# Patient Record
Sex: FEMALE | Race: OTHER | NOT HISPANIC OR LATINO | Employment: STUDENT | URBAN - METROPOLITAN AREA
[De-identification: names, ages, dates, MRNs, and addresses within clinical notes are randomized per-mention and may not be internally consistent; named-entity substitution may affect disease eponyms.]

---

## 2019-02-18 ENCOUNTER — OFFICE VISIT (OUTPATIENT)
Dept: OBGYN CLINIC | Facility: CLINIC | Age: 19
End: 2019-02-18
Payer: COMMERCIAL

## 2019-02-18 VITALS — WEIGHT: 211 LBS | DIASTOLIC BLOOD PRESSURE: 70 MMHG | SYSTOLIC BLOOD PRESSURE: 118 MMHG

## 2019-02-18 DIAGNOSIS — N90.89 SKIN TAG OF VULVA: Primary | ICD-10-CM

## 2019-02-18 PROCEDURE — 11200 RMVL SKIN TAGS UP TO&INC 15: CPT | Performed by: OBSTETRICS & GYNECOLOGY

## 2019-02-18 RX ORDER — GENTAMICIN SULFATE 3 MG/ML
SOLUTION/ DROPS OPHTHALMIC
Refills: 0 | COMMUNITY
Start: 2019-01-04

## 2019-02-18 RX ORDER — DOXEPIN HYDROCHLORIDE 25 MG/1
CAPSULE ORAL
Refills: 3 | COMMUNITY
Start: 2019-02-12

## 2019-02-18 RX ORDER — LEVONORGESTREL / ETHINYL ESTRADIOL AND ETHINYL ESTRADIOL 150-30(84)
1 KIT ORAL DAILY
Refills: 3 | COMMUNITY
Start: 2019-02-10

## 2019-02-18 NOTE — PATIENT INSTRUCTIONS
Topic:  Left vulvar skin tag    Skin today was removed without difficulty with the aid of curved scissors after the area had been anesthetized with local anesthetic and prepped with Betadine    Excellent hemostasis was achieved with Monsel's solution    Instructions were given to patient    She was encouraged to use warm tea bags soaks    Further treatment will be based upon final pathology results    Patient call for any problems issues or concerns that may arise

## 2019-02-18 NOTE — PROGRESS NOTES
Skin tag removal  Date/Time: 2/18/2019 12:59 PM  Performed by: Dasha Pollack MD  Authorized by: Dasha Pollack MD     Procedure Details - Skin Tag Destruction:     Up to 15      Body area:   Anogenital    Anogenital location:  Vulva    Initial size (mm):  5    Final defect size (mm):  10    Malignancy: benign lesion      Destruction method: scissors used for extraction    Lesion 6:      Topic:  Vulvar skin tag space    left side    1 cc of local anesthetic was injected beneath the skin tag after the area had been prepped with Betadine solution    The tag was gently lifted with forceps and removed with the aid of curved fine scissors    Excellent hemostasis was achieved with Monsel's solution    Instructions were given to the patient    Patient was encouraged to use warm tea bags soaks    Further treatment will be based upon final pathology results    Patient call for any problems issues or concerns that may arise

## 2019-03-08 ENCOUNTER — TELEPHONE (OUTPATIENT)
Dept: OBGYN CLINIC | Facility: CLINIC | Age: 19
End: 2019-03-08

## 2019-03-08 NOTE — TELEPHONE ENCOUNTER
----- Message from Luz Marina Corley MD sent at 3/8/2019  2:20 PM EST -----  Benign biopsy    Could you call the lab and  see if they could run HPV typing on the tissue    Thanks

## 2019-03-12 ENCOUNTER — TELEPHONE (OUTPATIENT)
Dept: OBGYN CLINIC | Facility: CLINIC | Age: 19
End: 2019-03-12

## 2019-03-15 LAB
CLINICAL INFO: NORMAL
HUMAN PAPILLOMA VIRUS 16+18 DNA [PRESENCE] IN TISSUE BY PROBE: NOT DETECTED
HUMAN PAPILLOMA VIRUS 6+11 DNA [PRESENCE] IN TISSUE BY PROBE: NOT DETECTED
Lab: DETECTED
Lab: NORMAL
Lab: NORMAL
PATH REPORT.FINAL DX SPEC: NORMAL
PRIMARY SITE CANCER NARRATIVE: NORMAL
PROCEDURE TYPE: NORMAL
QUESTION/PROBLEM: NORMAL
REF LAB TEST NAME: NORMAL
REF LAB TEST: NORMAL
SL AMB CLIENT CONTACT: NORMAL
SL AMB CONTAINER TYPE: NORMAL
SL AMB FINAL RESOLUTION: NORMAL
SL AMB REPORT STATUS: NORMAL
SPECIMEN SOURCE: NORMAL

## 2019-09-15 ENCOUNTER — HOSPITAL ENCOUNTER (EMERGENCY)
Facility: HOSPITAL | Age: 19
Discharge: HOME | End: 2019-09-15
Attending: EMERGENCY MEDICINE | Admitting: EMERGENCY MEDICINE
Payer: COMMERCIAL

## 2019-09-15 ENCOUNTER — APPOINTMENT (EMERGENCY)
Dept: RADIOLOGY | Facility: HOSPITAL | Age: 19
End: 2019-09-15
Payer: COMMERCIAL

## 2019-09-15 VITALS
BODY MASS INDEX: 40.74 KG/M2 | RESPIRATION RATE: 18 BRPM | DIASTOLIC BLOOD PRESSURE: 81 MMHG | SYSTOLIC BLOOD PRESSURE: 140 MMHG | HEART RATE: 75 BPM | WEIGHT: 229.94 LBS | HEIGHT: 63 IN | OXYGEN SATURATION: 98 % | TEMPERATURE: 97.6 F

## 2019-09-15 DIAGNOSIS — J32.4 PANSINUSITIS, UNSPECIFIED CHRONICITY: ICD-10-CM

## 2019-09-15 DIAGNOSIS — G44.319 ACUTE POST-TRAUMATIC HEADACHE, NOT INTRACTABLE: Primary | ICD-10-CM

## 2019-09-15 LAB
EXT PREG TEST URINE: NEGATIVE
EXT. CONTROL ED NAV: NORMAL

## 2019-09-15 PROCEDURE — 70486 CT MAXILLOFACIAL W/O DYE: CPT

## 2019-09-15 PROCEDURE — 99284 EMERGENCY DEPT VISIT MOD MDM: CPT | Performed by: EMERGENCY MEDICINE

## 2019-09-15 PROCEDURE — 70450 CT HEAD/BRAIN W/O DYE: CPT

## 2019-09-15 PROCEDURE — 81025 URINE PREGNANCY TEST: CPT | Performed by: EMERGENCY MEDICINE

## 2019-09-15 PROCEDURE — 99284 EMERGENCY DEPT VISIT MOD MDM: CPT

## 2019-09-15 RX ORDER — METOCLOPRAMIDE 10 MG/1
10 TABLET ORAL ONCE
Status: COMPLETED | OUTPATIENT
Start: 2019-09-15 | End: 2019-09-15

## 2019-09-15 RX ORDER — IBUPROFEN 400 MG/1
800 TABLET ORAL ONCE
Status: COMPLETED | OUTPATIENT
Start: 2019-09-15 | End: 2019-09-15

## 2019-09-15 RX ADMIN — IBUPROFEN 800 MG: 400 TABLET ORAL at 10:54

## 2019-09-15 RX ADMIN — METOCLOPRAMIDE HYDROCHLORIDE 10 MG: 10 TABLET ORAL at 08:57
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
## 2019-09-15 NOTE — DISCHARGE INSTRUCTIONS
Today you were seen in the emergency department for a headache  You may take ibuprofen and Tylenol as needed for headache  You may need to rest for the next day or so to help with your headache  Please follow up with a physician from [facility] as needed  On CT scan, they did find extensive pansinusitis (which helps to explain why you're congested)  You may take over-the-counter decongestants as needed  May need to follow-up with primary care doctor here or ENT eventually for further workup if this doesn't get better  Please return to the emergency department if you have any additional concerns

## 2019-11-22 ENCOUNTER — HOSPITAL ENCOUNTER (EMERGENCY)
Facility: HOSPITAL | Age: 19
Discharge: HOME | End: 2019-11-22
Attending: EMERGENCY MEDICINE | Admitting: EMERGENCY MEDICINE
Payer: COMMERCIAL

## 2019-11-22 VITALS
SYSTOLIC BLOOD PRESSURE: 140 MMHG | HEART RATE: 112 BPM | OXYGEN SATURATION: 98 % | DIASTOLIC BLOOD PRESSURE: 86 MMHG | TEMPERATURE: 97.8 F | RESPIRATION RATE: 20 BRPM

## 2019-11-22 DIAGNOSIS — F10.929 ALCOHOL INTOXICATION (HCC): Primary | ICD-10-CM

## 2019-11-22 DIAGNOSIS — F41.9 ANXIETY: ICD-10-CM

## 2019-11-22 PROCEDURE — 99284 EMERGENCY DEPT VISIT MOD MDM: CPT

## 2019-11-22 PROCEDURE — 99284 EMERGENCY DEPT VISIT MOD MDM: CPT | Performed by: EMERGENCY MEDICINE
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
## 2019-11-22 NOTE — DISCHARGE INSTRUCTIONS
Follow-up with her primary care physician regarding any medical needs  Return to the emergency department with any worsening symptoms
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
## 2020-10-02 ENCOUNTER — HOSPITAL ENCOUNTER (OUTPATIENT)
Facility: CLINIC | Age: 20
Discharge: HOME | End: 2020-10-02
Attending: FAMILY MEDICINE
Payer: COMMERCIAL

## 2020-10-02 VITALS
SYSTOLIC BLOOD PRESSURE: 110 MMHG | TEMPERATURE: 98.7 F | DIASTOLIC BLOOD PRESSURE: 80 MMHG | RESPIRATION RATE: 16 BRPM | OXYGEN SATURATION: 98 % | HEART RATE: 98 BPM

## 2020-10-02 DIAGNOSIS — H66.90 ACUTE OTITIS MEDIA, UNSPECIFIED OTITIS MEDIA TYPE: Primary | ICD-10-CM

## 2020-10-02 PROCEDURE — S9088 SERVICES PROVIDED IN URGENT: HCPCS | Performed by: FAMILY MEDICINE

## 2020-10-02 PROCEDURE — 99203 OFFICE O/P NEW LOW 30 MIN: CPT | Performed by: FAMILY MEDICINE

## 2020-10-02 RX ORDER — AMOXICILLIN AND CLAVULANATE POTASSIUM 875; 125 MG/1; MG/1
1 TABLET, FILM COATED ORAL
Qty: 14 TABLET | Refills: 0 | Status: SHIPPED | OUTPATIENT
Start: 2020-10-02 | End: 2020-10-09

## 2020-10-02 ASSESSMENT — ENCOUNTER SYMPTOMS
FEVER: 0
COUGH: 0
SORE THROAT: 0
SINUS PRESSURE: 0
CHILLS: 0

## 2020-10-02 NOTE — DISCHARGE INSTRUCTIONS
Take the antibiotic as prescribed for your ear infection.   Tylenol or Advil as needed for pain.  Follow up with your Primary Doctor in a week or sooner if your symptoms become worse.

## 2021-07-20 ENCOUNTER — OFFICE VISIT (OUTPATIENT)
Dept: PRIMARY CARE | Facility: CLINIC | Age: 21
End: 2021-07-20
Payer: COMMERCIAL

## 2021-07-20 VITALS
SYSTOLIC BLOOD PRESSURE: 118 MMHG | HEART RATE: 100 BPM | WEIGHT: 221 LBS | HEIGHT: 64 IN | RESPIRATION RATE: 16 BRPM | TEMPERATURE: 98.2 F | BODY MASS INDEX: 37.73 KG/M2 | OXYGEN SATURATION: 98 % | DIASTOLIC BLOOD PRESSURE: 76 MMHG

## 2021-07-20 DIAGNOSIS — F60.3 BORDERLINE PERSONALITY DISORDER (CMS/HCC): ICD-10-CM

## 2021-07-20 DIAGNOSIS — I10 ESSENTIAL HYPERTENSION: ICD-10-CM

## 2021-07-20 DIAGNOSIS — E55.9 VITAMIN D DEFICIENCY: ICD-10-CM

## 2021-07-20 DIAGNOSIS — E66.812 CLASS 2 SEVERE OBESITY WITH SERIOUS COMORBIDITY AND BODY MASS INDEX (BMI) OF 37.0 TO 37.9 IN ADULT, UNSPECIFIED OBESITY TYPE (CMS/HCC): Primary | ICD-10-CM

## 2021-07-20 DIAGNOSIS — F43.10 PTSD (POST-TRAUMATIC STRESS DISORDER): ICD-10-CM

## 2021-07-20 DIAGNOSIS — E66.01 CLASS 2 SEVERE OBESITY WITH SERIOUS COMORBIDITY AND BODY MASS INDEX (BMI) OF 37.0 TO 37.9 IN ADULT, UNSPECIFIED OBESITY TYPE (CMS/HCC): Primary | ICD-10-CM

## 2021-07-20 DIAGNOSIS — Z72.0 CURRENT NICOTINE VAPING ON SOME DAYS: ICD-10-CM

## 2021-07-20 DIAGNOSIS — G43.109 MIGRAINE WITH AURA AND WITHOUT STATUS MIGRAINOSUS, NOT INTRACTABLE: ICD-10-CM

## 2021-07-20 DIAGNOSIS — F90.9 ATTENTION DEFICIT HYPERACTIVITY DISORDER (ADHD), UNSPECIFIED ADHD TYPE: ICD-10-CM

## 2021-07-20 DIAGNOSIS — N80.9 ENDOMETRIOSIS: ICD-10-CM

## 2021-07-20 DIAGNOSIS — F41.1 GENERALIZED ANXIETY DISORDER WITH PANIC ATTACKS: ICD-10-CM

## 2021-07-20 DIAGNOSIS — F41.0 GENERALIZED ANXIETY DISORDER WITH PANIC ATTACKS: ICD-10-CM

## 2021-07-20 DIAGNOSIS — F33.41 RECURRENT MAJOR DEPRESSIVE DISORDER, IN PARTIAL REMISSION (CMS/HCC): ICD-10-CM

## 2021-07-20 PROBLEM — F32.A DEPRESSION: Status: ACTIVE | Noted: 2019-05-16

## 2021-07-20 PROCEDURE — 99205 OFFICE O/P NEW HI 60 MIN: CPT | Performed by: STUDENT IN AN ORGANIZED HEALTH CARE EDUCATION/TRAINING PROGRAM

## 2021-07-20 PROCEDURE — 3008F BODY MASS INDEX DOCD: CPT | Performed by: STUDENT IN AN ORGANIZED HEALTH CARE EDUCATION/TRAINING PROGRAM

## 2021-07-20 PROCEDURE — 3078F DIAST BP <80 MM HG: CPT | Performed by: STUDENT IN AN ORGANIZED HEALTH CARE EDUCATION/TRAINING PROGRAM

## 2021-07-20 PROCEDURE — 3074F SYST BP LT 130 MM HG: CPT | Performed by: STUDENT IN AN ORGANIZED HEALTH CARE EDUCATION/TRAINING PROGRAM

## 2021-07-20 RX ORDER — LAMOTRIGINE 150 MG/1
TABLET ORAL
COMMUNITY
Start: 2021-07-15

## 2021-07-20 RX ORDER — DEXTROAMPHETAMINE SACCHARATE, AMPHETAMINE ASPARTATE, DEXTROAMPHETAMINE SULFATE AND AMPHETAMINE SULFATE 7.5; 7.5; 7.5; 7.5 MG/1; MG/1; MG/1; MG/1
30 TABLET ORAL DAILY
COMMUNITY
Start: 2021-06-21

## 2021-07-20 RX ORDER — SERTRALINE HYDROCHLORIDE 100 MG/1
200 TABLET, FILM COATED ORAL
COMMUNITY
Start: 2021-03-08

## 2021-07-20 RX ORDER — TOPIRAMATE 25 MG/1
25 TABLET ORAL NIGHTLY
Qty: 90 TABLET | Refills: 0 | Status: SHIPPED | OUTPATIENT
Start: 2021-07-20 | End: 2021-08-04

## 2021-07-20 RX ORDER — ATENOLOL 25 MG/1
25 TABLET ORAL DAILY
Qty: 30 TABLET | Refills: 0 | Status: SHIPPED | OUTPATIENT
Start: 2021-07-20 | End: 2021-08-04 | Stop reason: ALTCHOICE

## 2021-07-20 RX ORDER — CLONAZEPAM 1 MG/1
1 TABLET ORAL 2 TIMES DAILY PRN
COMMUNITY
Start: 2021-05-27

## 2021-07-20 RX ORDER — ATENOLOL 50 MG/1
50 TABLET ORAL
COMMUNITY
Start: 2021-06-20 | End: 2021-07-20 | Stop reason: DRUGHIGH

## 2021-07-20 RX ORDER — DOXEPIN HYDROCHLORIDE 25 MG/1
CAPSULE ORAL
COMMUNITY
Start: 2021-06-20

## 2021-07-20 RX ORDER — LEVONORGESTREL / ETHINYL ESTRADIOL AND ETHINYL ESTRADIOL 150-30(84)
1 KIT ORAL
COMMUNITY
Start: 2021-06-02

## 2021-07-20 RX ORDER — LISINOPRIL 2.5 MG/1
2.5 TABLET ORAL DAILY
Qty: 30 TABLET | Refills: 0 | Status: SHIPPED | OUTPATIENT
Start: 2021-07-20 | End: 2021-08-04 | Stop reason: DRUGHIGH

## 2021-07-20 ASSESSMENT — ENCOUNTER SYMPTOMS
MUSCULOSKELETAL NEGATIVE: 1
ENDOCRINE NEGATIVE: 1
GASTROINTESTINAL NEGATIVE: 1
HEMATOLOGIC/LYMPHATIC NEGATIVE: 1
EYES NEGATIVE: 1
UNEXPECTED WEIGHT CHANGE: 1
ALLERGIC/IMMUNOLOGIC NEGATIVE: 1
RESPIRATORY NEGATIVE: 1
NEUROLOGICAL NEGATIVE: 1
CARDIOVASCULAR NEGATIVE: 1
DYSPHORIC MOOD: 1

## 2021-07-20 ASSESSMENT — PATIENT HEALTH QUESTIONNAIRE - PHQ9: SUM OF ALL RESPONSES TO PHQ9 QUESTIONS 1 & 2: 0

## 2021-07-20 NOTE — ASSESSMENT & PLAN NOTE
Chronic, ongoing.   Check labwork to evaluate for comorbidity- today we discussed her weight gain is likely anti-psychotic associated from her Lamictal.   Follow up for visit to start pharmacotherapy - today we discussed Metformin, Ozempic and Wegovy, she will call her insurance about coverage for these injectables, if they are not covered, we will start Metformin.   Follow up in 2 weeks to review labs, start medication and address nutrition.

## 2021-07-20 NOTE — Clinical Note
Can you call patient and ask her to send copy of COVID vaccination record and update her HCM? Thank you!

## 2021-07-20 NOTE — ASSESSMENT & PLAN NOTE
Due for vitamin D level recheck.   Educated patient that Vitamin D is important to reduce inflammation, modulate processes such as cell growth, neuromuscular and immune function, as well as glucose metabolism.   Counseled patient that low Vitamin D levels can leave inflammation unchecked, lead to suboptimal glucose metabolism as well as worsen insulin resistance. All this in turn can make weight loss more challenging.   Counseled patient that the optimal range for Vitamin D levels for weight loss is greater than 50.  Vitamin D supplements can be obtained over the counter at the pharmacy, adequate repletion can be either 2000 IU daily or 10,0000 IU once weekly. Vitamin D is best absorbed when taken with food or a meal that contains fat.

## 2021-07-20 NOTE — ASSESSMENT & PLAN NOTE
Well controlled at today's visit on Atenolol 50mg daily.   Educated patient that high blood pressure can come from various reasons.    Excess weight can worsen and sometimes be the cause of high blood pressure through putting pressure on the kidneys through mechanical forces as well as increasing body inflammation and promoting water retention.  Reduce Atenolol to 25mg daily for one week, check your blood pressure daily and send me a log over [facility], goal blood pressure is < 130/80. If your blood pressure is over this on Atenolol 25mg, we will start Lisinopril 2.5mg in addition to Atenolol, you will continue to check your blood pressure daily.   With change in nutrition and increased physical activity, blood pressure medications may need to be reduced or discontinued.   Follow up in 2 weeks for BP check and med review.

## 2021-07-20 NOTE — ASSESSMENT & PLAN NOTE
Chronic, ongoing.   Continue Doxepin as prescribed and regular follow up with Dr. Kady Layton in Psychiatry.

## 2021-07-20 NOTE — ASSESSMENT & PLAN NOTE
Educated patient that obesity increases the risk of developing migraine or headache disorder.  The direct pathophysiology between migraines and obesity is still under study. We know adipose tissue is metabolically active and produces signaling molecules that lead to an increase in inflammatory proteins throughout the body. It is thought that this constant state of low-level inflammation can pre-dispose individuals with obesity to migraine or headache disorders. In turn, weight reduction can improve headache symptoms as there is less adipose tissue to produce inflammatory proteins.  Today we discussed starting Topamax to address frequent migraines while titrating off of weight positive Atenolol   Counseled patient as below regarding Topamax  Initial dosing: Take 25 mg by mouth at night. We can increase in 25 to 50 mg increments based on your response and tolerability up to 200 mg/day. Please try to avoid using alcohol within 6 hours of taking this medication.   SIDE EFFECTS: Sleepiness, word finding difficulty, numbness or tingling, dizziness, all of which will resolve with discontinuation of medication.  WHO SHOULD NOT TAKE TOPAMAX:  Patients with history of kidney stones should NOT take Topamax, as it can increase the risk of kidney stones. Patients who are desiring pregnancy or are pregnant, Topamax is teratogenic (increased risk of oral cleft defects, T1) Patient agreed to check pregnancy test prior to starting treatment and maintain regular use of 2 forms of contraception if a woman of child-bearing potential. At higher doses (>200mg daily) there is a theoretical risk of reduced efficacy of oral contraceptive pills. Your pharmacist may alert you regarding this when you  your prescription.   Her current method of birth control is: OCP

## 2021-07-20 NOTE — PROGRESS NOTES
NEW PATIENT VISIT     [facility]   [facility] [place] GUTIERREZ HERNANDEZ M.D.  [phone]      HISTORY OF PRESENT ILLNESS - ASSESSMENT AND PLAN      CC:   Chief Complaint   Patient presents with   • Med Management     Katy Escudero is a 20 y.o. female with a history of depression, anxiety with panic attacks, PTSD, borderline personality disorder, endometriosis, ADHD, migraines, vitamin D deficiency, and hypertension who presents for a new patient visit to establish care.     Previously followed with [facility].   We reviewed her past medical history and medication list extensively today.   Follows with Dr. Kady Layton in Psychiatry.   Taking Zoloft for depression since age 16.   Taking Lamictal as mood stabilizer since age 16.   Prescribed Seasonique for birth control by her Gyn, has endometriosis.   Taking Doxepin for nightmares associated with PTSD since age 16.   Trauma occurred at 11  Taking Klonopin for panic attacks since age 16.   Taking Adderall for ADHD since age 19.   Taking Atenolol for migraine prevention and for antihypertensive effect since age 14.     Reports her mood symptoms are all well controlled on current regimen.     Noted changes in her weight since age 16. Gained 100lbs in a few months after starting psychiatric medication regimen. Reports she has struggled with eating disorders in her past, but no longer. She is interested in medical assistance with weight loss.     Not currently taking Vitamin D supplementation. Does not get regular sun exposure.     Assessment   Problem List Items Addressed This Visit        Nervous    Migraine with aura and without status migrainosus, not intractable    Current Assessment & Plan     Educated patient that obesity increases the risk of developing migraine or headache disorder.  The direct pathophysiology between migraines and obesity is still under study. We know adipose tissue is metabolically active and produces signaling  molecules that lead to an increase in inflammatory proteins throughout the body. It is thought that this constant state of low-level inflammation can pre-dispose individuals with obesity to migraine or headache disorders. In turn, weight reduction can improve headache symptoms as there is less adipose tissue to produce inflammatory proteins.  Today we discussed starting Topamax to address frequent migraines while titrating off of weight positive Atenolol   Counseled patient as below regarding Topamax  Initial dosing: Take 25 mg by mouth at night. We can increase in 25 to 50 mg increments based on your response and tolerability up to 200 mg/day. Please try to avoid using alcohol within 6 hours of taking this medication.   SIDE EFFECTS: Sleepiness, word finding difficulty, numbness or tingling, dizziness, all of which will resolve with discontinuation of medication.  WHO SHOULD NOT TAKE TOPAMAX:  Patients with history of kidney stones should NOT take Topamax, as it can increase the risk of kidney stones. Patients who are desiring pregnancy or are pregnant, Topamax is teratogenic (increased risk of oral cleft defects, T1) Patient agreed to check pregnancy test prior to starting treatment and maintain regular use of 2 forms of contraception if a woman of child-bearing potential. At higher doses (>200mg daily) there is a theoretical risk of reduced efficacy of oral contraceptive pills. Your pharmacist may alert you regarding this when you  your prescription.   Her current method of birth control is: OCP                 Relevant Medications    doxepin (SINEquan) 25 mg capsule    lamoTRIgine (LaMICtal) 150 mg tablet    sertraline (ZOLOFT) 100 mg tablet    topiramate (TOPAMAX) 25 mg tablet    atenoloL (TENORMIN) 25 mg tablet       Circulatory    Essential hypertension    Current Assessment & Plan     Well controlled at today's visit on Atenolol 50mg daily.   Educated patient that high blood pressure can come from  various reasons.    Excess weight can worsen and sometimes be the cause of high blood pressure through putting pressure on the kidneys through mechanical forces as well as increasing body inflammation and promoting water retention.  Reduce Atenolol to 25mg daily for one week, check your blood pressure daily and send me a log over [facility], goal blood pressure is < 130/80. If your blood pressure is over this on Atenolol 25mg, we will start Lisinopril 2.5mg in addition to Atenolol, you will continue to check your blood pressure daily.   With change in nutrition and increased physical activity, blood pressure medications may need to be reduced or discontinued.   Follow up in 2 weeks for BP check and med review.            Relevant Medications    atenoloL (TENORMIN) 25 mg tablet    lisinopriL (PRINIVIL) 2.5 mg tablet       Digestive    Vitamin D deficiency    Current Assessment & Plan     Due for vitamin D level recheck.   Educated patient that Vitamin D is important to reduce inflammation, modulate processes such as cell growth, neuromuscular and immune function, as well as glucose metabolism.   Counseled patient that low Vitamin D levels can leave inflammation unchecked, lead to suboptimal glucose metabolism as well as worsen insulin resistance. All this in turn can make weight loss more challenging.   Counseled patient that the optimal range for Vitamin D levels for weight loss is greater than 50.  Vitamin D supplements can be obtained over the counter at the pharmacy, adequate repletion can be either 2000 IU daily or 10,0000 IU once weekly. Vitamin D is best absorbed when taken with food or a meal that contains fat.                 Relevant Orders    Vitamin D 1,25-Dihydroxy       Musculoskeletal    PTSD (post-traumatic stress disorder)    Current Assessment & Plan     Chronic, ongoing.   Continue Doxepin as prescribed and regular follow up with Dr. Kady Layton in Psychiatry.          Relevant  Medications    dextroamphetamine-amphetamine (ADDERALL) 30 mg tablet    doxepin (SINEquan) 25 mg capsule    sertraline (ZOLOFT) 100 mg tablet       Endocrine/Metabolic    Class 2 severe obesity with serious comorbidity and body mass index (BMI) of 37.0 to 37.9 in adult (CMS/[facility]) - Primary    Current Assessment & Plan     Chronic, ongoing.   Check labwork to evaluate for comorbidity- today we discussed her weight gain is likely anti-psychotic associated from her Lamictal.   Follow up for visit to start pharmacotherapy - today we discussed Metformin, Ozempic and Wegovy, she will call her insurance about coverage for these injectables, if they are not covered, we will start Metformin.   Follow up in 2 weeks to review labs, start medication and address nutrition.          Relevant Orders    Ambulatory referral to [facility]    CBC    Comprehensive metabolic panel    Lipid panel    TSH w reflex FT4       Other    Borderline personality disorder (CMS/[facility])    Current Assessment & Plan     Chronic, ongoing.   Continue regular follow up with Dr. Kady Layton in Psychiatry.          Relevant Medications    dextroamphetamine-amphetamine (ADDERALL) 30 mg tablet    doxepin (SINEquan) 25 mg capsule    sertraline (ZOLOFT) 100 mg tablet    Depression    Current Assessment & Plan     Chronic, ongoing.   Continue Zoloft and Lamictal as prescribed and regular follow up with Dr. Kady Layton in Psychiatry.          Relevant Medications    dextroamphetamine-amphetamine (ADDERALL) 30 mg tablet    doxepin (SINEquan) 25 mg capsule    lamoTRIgine (LaMICtal) 150 mg tablet    sertraline (ZOLOFT) 100 mg tablet    Endometriosis    Current Assessment & Plan     Chronic, menorrhagia improved on OCP.   Continue OCP as prescribed and regular follow up with Gyn.          Relevant Medications    L norgest/e.estradioL-e.estrad (SEASONIQUE) 0.15 mg-30 mcg (84)/10 mcg (7) tablet    ADHD    Current Assessment & Plan      Chronic, ongoing.   Continue Adderall as prescribed and regular follow up with Dr. Kady Layton in Psychiatry.          Relevant Medications    dextroamphetamine-amphetamine (ADDERALL) 30 mg tablet    doxepin (SINEquan) 25 mg capsule    sertraline (ZOLOFT) 100 mg tablet    Generalized anxiety disorder with panic attacks    Current Assessment & Plan     Chronic, ongoing.   Continue Klonopin as prescribed and regular follow up with Dr. Kady Layton in Psychiatry.            Relevant Medications    clonazePAM (klonoPIN) 1 mg tablet    dextroamphetamine-amphetamine (ADDERALL) 30 mg tablet    doxepin (SINEquan) 25 mg capsule    sertraline (ZOLOFT) 100 mg tablet    Current nicotine vaping on some days    Current Assessment & Plan     Chronic, ongoing.   Currently pre-contemplative regarding cessation.   Cessation encouraged.                 PAST MEDICAL AND SURGICAL HISTORY        Past Medical History:   Diagnosis Date   • ADHD 7/20/2021   • Borderline personality disorder (CMS/HCC) 3/25/2021   • Depression 5/16/2019   • Endometriosis 3/25/2021   • Essential hypertension 7/20/2021   • Generalized anxiety disorder with panic attacks 7/20/2021   • Migraine with aura and without status migrainosus, not intractable 7/20/2021   • PTSD (post-traumatic stress disorder) 5/16/2019   • Vitamin D deficiency 7/20/2021       No past surgical history on file.  MEDICATIONS          Current Outpatient Medications:   •  clonazePAM (klonoPIN) 1 mg tablet, Take 1 mg by mouth 2 (two) times a day as needed., Disp: , Rfl:   •  dextroamphetamine-amphetamine (ADDERALL) 30 mg tablet, Take 30 mg by mouth daily.  , Disp: , Rfl:   •  doxepin (SINEquan) 25 mg capsule, TAKE 1 TO 2 CAPSULES BY MOUTH EVERY DAY AT BEDTIME, Disp: , Rfl:   •  L norgest/e.estradioL-e.estrad (SEASONIQUE) 0.15 mg-30 mcg (84)/10 mcg (7) tablet, Take 1 tablet by mouth once daily., Disp: , Rfl:   •  lamoTRIgine (LaMICtal) 150 mg tablet, , Disp: , Rfl:   •  sertraline (ZOLOFT) 100  "mg tablet, Take 200 mg by mouth., Disp: , Rfl:   •  atenoloL (TENORMIN) 25 mg tablet, Take 1 tablet (25 mg total) by mouth daily., Disp: 30 tablet, Rfl: 0  •  lisinopriL (PRINIVIL) 2.5 mg tablet, Take 1 tablet (2.5 mg total) by mouth daily., Disp: 30 tablet, Rfl: 0  •  topiramate (TOPAMAX) 25 mg tablet, Take 1 tablet (25 mg total) by mouth nightly., Disp: 90 tablet, Rfl: 0  ALLERGIES        Patient has no known allergies.  FAMILY HISTORY        Family History   Problem Relation Age of Onset   • Diabetes Biological Mother    • Diabetes Biological Father      SOCIAL/ TOBACCO HISTORY        Social History     Tobacco Use   • Smoking status: Current Every Day Smoker     Types: Electronic Cigarette   • Smokeless tobacco: Never Used   Vaping Use   • Vaping Use: Some days   Substance Use Topics   • Alcohol use: Yes     Comment: socially    • Drug use: Not Currently     Types: Cocaine, Marijuana     REVIEW OF SYSTEMS        Review of Systems   Constitutional: Positive for unexpected weight change.   HENT: Negative.    Eyes: Negative.    Respiratory: Negative.    Cardiovascular: Negative.    Gastrointestinal: Negative.    Endocrine: Negative.    Genitourinary: Negative.    Musculoskeletal: Negative.    Skin: Negative.    Allergic/Immunologic: Negative.    Neurological: Negative.    Hematological: Negative.    Psychiatric/Behavioral: Positive for dysphoric mood.      PHYSICAL EXAMINATION      Visit Vitals  /76   Pulse 100   Temp 36.8 °C (98.2 °F)   Resp 16   Ht 1.626 m (5' 4\")   Wt 100 kg (221 lb)   SpO2 98%   BMI 37.93 kg/m²        Physical Exam  Vitals reviewed.   Constitutional:       Appearance: Normal appearance. She is well-developed. She is obese.   HENT:      Head: Normocephalic and atraumatic.      Right Ear: External ear normal.      Left Ear: External ear normal.   Eyes:      General: Lids are normal.      Extraocular Movements: Extraocular movements intact.      Conjunctiva/sclera: Conjunctivae normal.      " Pupils: Pupils are equal, round, and reactive to light.   Pulmonary:      Effort: Pulmonary effort is normal.      Breath sounds: Normal breath sounds. No decreased breath sounds.   Abdominal:      General: Abdomen is protuberant.   Musculoskeletal:      Cervical back: Normal range of motion.   Skin:     General: Skin is warm and dry.   Neurological:      General: No focal deficit present.      Mental Status: She is alert and oriented to person, place, and time. Mental status is at baseline.   Psychiatric:         Attention and Perception: Attention and perception normal.         Mood and Affect: Mood and affect normal.         Speech: Speech normal.         Behavior: Behavior normal. Behavior is cooperative.         Thought Content: Thought content normal.         Cognition and Memory: Cognition and memory normal.         Judgment: Judgment normal.       PRIOR LABS      Labs Reviewed in Care Everywhere from July 2019.    Ref Range & Units 1 yr ago Comments   WBC 3.4 - 10.8 x10E3/uL 10.6          RBC 3.77 - 5.28 x10E6/uL 4.30          Hemoglobin 11.1 - 15.9 g/dL 12.2          Hematocrit 34.0 - 46.6 % 37.5          MCV 79 - 97 fL 87          MCH 26.6 - 33.0 pg 28.4          MCHC 31.5 - 35.7 g/dL 32.5          RDW 12.3 - 15.4 % 13.9          Platelets 150 - 450 x10E3/uL 353          Neutrophils Not Estab. % 64          Lymphs Not Estab. % 31          Monocytes Not Estab. % 4          Eos Not Estab. % 1          Basos Not Estab. % 0          Neutrophils (Absolute) 1.4 - 7.0 x10E3/uL 6.7          Lymphs (Absolute) 0.7 - 3.1 x10E3/uL 3.3High           Monocytes(Absolute) 0.1 - 0.9 x10E3/uL 0.5          Eos (Absolute) 0.0 - 0.4 x10E3/uL 0.1          Baso (Absolute) 0.0 - 0.2 x10E3/uL 0.0          Immature Granulocytes Not Estab. % 0          Immature Grans (Abs) 0.0 - 0.1 x10E3/uL 0.0       Glucose, Serum 65 - 99 mg/dL 94        BUN 6 - 20 mg/dL 10        Creatinine, Serum 0.57 - 1.00 mg/dL 0.70        eGFR If NonAfricn  Am >59 mL/min/1.73 125        eGFR If Africn Am >59 mL/min/1.73 144        BUN/Creatinine Ratio 9 - 23  14        Sodium, Serum 134 - 144 mmol/L 138        Potassium, Serum 3.5 - 5.2 mmol/L 5.2        Chloride, Serum 96 - 106 mmol/L 100        Carbon Dioxide, Total 20 - 29 mmol/L 22        Calcium, Serum 8.7 - 10.2 mg/dL 9.4        Protein, Total, Serum 6.0 - 8.5 g/dL 7.8        Albumin, Serum 3.5 - 5.5 g/dL 4.2        Globulin, Total 1.5 - 4.5 g/dL 3.6        A/G Ratio 1.2 - 2.2  1.2        Bilirubin, Total 0.0 - 1.2 mg/dL 0.2        Alkaline Phosphatase, S 39 - 117 IU/L 83        AST (SGOT) 0 - 40 IU/L 12        ALT (SGPT) 0 - 32 IU/L 6      Cholesterol, Total 100 - 199 mg/dL 159        Triglycerides 0 - 149 mg/dL 112        HDL Cholesterol >39 mg/dL 47        VLDL Cholesterol Darren 5 - 40 mg/dL 22        LDL Cholesterol Calc 0 - 99 mg/dL 90      Hemoglobin A1c 4.8 - 5.6 % 5.5      HIV Screen 4th Generation wRfx Non Reactive  Non Reactive      TSH 0.450 - 4.500 uIU/mL 1.710      Vitamin D, 25-Hydroxy 30.0 - 100.0 ng/mL 16.2      TIME   I spent 60 minutes on this date of service performing the following activities: obtaining history, performing examination, entering orders, documenting, obtaining / reviewing records, providing counseling and education and communicating results.    Asked MA to obtain records of prior immunizations, screenings and records of outside care as below:    Pap Smear: Dr. Maria Esther Florence at [facility]     Today I reviewed prior records in Care Everywhere including vaccine records as below.     • DTaP 1998, 1998, 02/18/1999, 02/15/2000, 09/02/2003   • HIB external 1998, 1998, 02/18/1999, 11/08/1999   • Hep A, Unspecified Formulation 07/01/2008, 06/24/2009   • Hep B - Adolescent 1998, 1998, 02/18/1999   • IPV 1998, 1998, 02/15/2000, 08/25/2004   • Influenza, Unspecified Formulation 08/18/2010, 10/17/2011, 10/19/2015   • MMR 11/08/1999, 09/02/2003   •  Meningococcal MCV4P (Menactra) 08/18/2010, 08/15/2014   • Prevnar 7 Pediatric 08/08/2000   • TDAP 08/18/2010   • TDaP 03/25/2021   • Varicella 08/09/1999, 06/24/2009     Tiffany Mauricio MD  7/20/2021

## 2021-07-20 NOTE — ASSESSMENT & PLAN NOTE
Chronic, menorrhagia improved on OCP.   Continue OCP as prescribed and regular follow up with Gyn.

## 2021-07-20 NOTE — ASSESSMENT & PLAN NOTE
Chronic, ongoing.   Continue Klonopin as prescribed and regular follow up with Dr. Kady Layton in Psychiatry.

## 2021-07-20 NOTE — ASSESSMENT & PLAN NOTE
Chronic, ongoing.   Continue Zoloft and Lamictal as prescribed and regular follow up with Dr. Kady Layton in Psychiatry.

## 2021-07-20 NOTE — ASSESSMENT & PLAN NOTE
Chronic, ongoing.   Continue Adderall as prescribed and regular follow up with Dr. Kady Layton in Psychiatry.

## 2021-07-28 LAB
ALBUMIN SERPL-MCNC: 4.4 G/DL (ref 3.9–5)
ALBUMIN/GLOB SERPL: 1.4 {RATIO} (ref 1.2–2.2)
ALP SERPL-CCNC: 91 IU/L (ref 48–121)
ALT SERPL-CCNC: 7 IU/L (ref 0–32)
AST SERPL-CCNC: 14 IU/L (ref 0–40)
BASOPHILS # BLD AUTO: 0 X10E3/UL (ref 0–0.2)
BASOPHILS NFR BLD AUTO: 0 %
BILIRUB SERPL-MCNC: 0.2 MG/DL (ref 0–1.2)
BUN SERPL-MCNC: 15 MG/DL (ref 6–20)
BUN/CREAT SERPL: 18 (ref 9–23)
CALCIUM SERPL-MCNC: 9.6 MG/DL (ref 8.7–10.2)
CHLORIDE SERPL-SCNC: 103 MMOL/L (ref 96–106)
CHOLEST SERPL-MCNC: 154 MG/DL (ref 100–199)
CO2 SERPL-SCNC: 20 MMOL/L (ref 20–29)
CREAT SERPL-MCNC: 0.85 MG/DL (ref 0.57–1)
EOSINOPHIL # BLD AUTO: 0.1 X10E3/UL (ref 0–0.4)
EOSINOPHIL NFR BLD AUTO: 1 %
ERYTHROCYTE [DISTWIDTH] IN BLOOD BY AUTOMATED COUNT: 13.4 % (ref 11.7–15.4)
GLOBULIN SER CALC-MCNC: 3.1 G/DL (ref 1.5–4.5)
GLUCOSE SERPL-MCNC: 94 MG/DL (ref 65–99)
HCT VFR BLD AUTO: 37.5 % (ref 34–46.6)
HDLC SERPL-MCNC: 39 MG/DL
HGB BLD-MCNC: 12.3 G/DL (ref 11.1–15.9)
IMM GRANULOCYTES # BLD AUTO: 0 X10E3/UL (ref 0–0.1)
IMM GRANULOCYTES NFR BLD AUTO: 0 %
LAB CORP EGFR IF AFRICN AM: 112 ML/MIN/1.73
LAB CORP EGFR IF NONAFRICN AM: 98 ML/MIN/1.73
LDLC SERPL CALC-MCNC: 97 MG/DL (ref 0–99)
LYMPHOCYTES # BLD AUTO: 2.6 X10E3/UL (ref 0.7–3.1)
LYMPHOCYTES NFR BLD AUTO: 29 %
MCH RBC QN AUTO: 28.6 PG (ref 26.6–33)
MCHC RBC AUTO-ENTMCNC: 32.8 G/DL (ref 31.5–35.7)
MCV RBC AUTO: 87 FL (ref 79–97)
MONOCYTES # BLD AUTO: 0.4 X10E3/UL (ref 0.1–0.9)
MONOCYTES NFR BLD AUTO: 5 %
NEUTROPHILS # BLD AUTO: 5.8 X10E3/UL (ref 1.4–7)
NEUTROPHILS NFR BLD AUTO: 65 %
PLATELET # BLD AUTO: 330 X10E3/UL (ref 150–450)
POTASSIUM SERPL-SCNC: 4.4 MMOL/L (ref 3.5–5.2)
PROT SERPL-MCNC: 7.5 G/DL (ref 6–8.5)
RBC # BLD AUTO: 4.3 X10E6/UL (ref 3.77–5.28)
SODIUM SERPL-SCNC: 137 MMOL/L (ref 134–144)
SPECIMEN STATUS: NORMAL
TRIGL SERPL-MCNC: 96 MG/DL (ref 0–149)
TSH SERPL DL<=0.005 MIU/L-ACNC: 1.68 UIU/ML (ref 0.45–4.5)
VLDLC SERPL CALC-MCNC: 18 MG/DL (ref 5–40)
WBC # BLD AUTO: 9 X10E3/UL (ref 3.4–10.8)

## 2021-07-29 LAB — 1,25(OH)2D SERPL-MCNC: 74.4 PG/ML (ref 19.9–79.3)

## 2021-07-29 NOTE — RESULT ENCOUNTER NOTE
"Dear Katy,     The results of your labs are normal except for your HDL. This is considered your \"good\" cholesterol because a higher level can lead to a decreased risk of heart attack or stroke later in life. You can improve your HDL through healthy, high fiber, low saturated fat diet and regular cardiovascular exercise with at least 30 minutes, 5 days a week.  Stay well and take care!  Best,  Tiffany Mauricio MD     "

## 2021-08-04 ENCOUNTER — OFFICE VISIT (OUTPATIENT)
Dept: PRIMARY CARE | Facility: CLINIC | Age: 21
End: 2021-08-04
Payer: COMMERCIAL

## 2021-08-04 VITALS
TEMPERATURE: 97.8 F | DIASTOLIC BLOOD PRESSURE: 90 MMHG | BODY MASS INDEX: 37.56 KG/M2 | HEIGHT: 64 IN | OXYGEN SATURATION: 98 % | HEART RATE: 103 BPM | RESPIRATION RATE: 16 BRPM | WEIGHT: 220 LBS | SYSTOLIC BLOOD PRESSURE: 132 MMHG

## 2021-08-04 DIAGNOSIS — E66.812 CLASS 2 SEVERE OBESITY WITH SERIOUS COMORBIDITY AND BODY MASS INDEX (BMI) OF 37.0 TO 37.9 IN ADULT, UNSPECIFIED OBESITY TYPE (CMS/HCC): Primary | ICD-10-CM

## 2021-08-04 DIAGNOSIS — I10 ESSENTIAL HYPERTENSION: ICD-10-CM

## 2021-08-04 DIAGNOSIS — E66.01 CLASS 2 SEVERE OBESITY WITH SERIOUS COMORBIDITY AND BODY MASS INDEX (BMI) OF 37.0 TO 37.9 IN ADULT, UNSPECIFIED OBESITY TYPE (CMS/HCC): Primary | ICD-10-CM

## 2021-08-04 DIAGNOSIS — G43.109 MIGRAINE WITH AURA AND WITHOUT STATUS MIGRAINOSUS, NOT INTRACTABLE: ICD-10-CM

## 2021-08-04 PROCEDURE — 3075F SYST BP GE 130 - 139MM HG: CPT | Performed by: STUDENT IN AN ORGANIZED HEALTH CARE EDUCATION/TRAINING PROGRAM

## 2021-08-04 PROCEDURE — 3080F DIAST BP >= 90 MM HG: CPT | Performed by: STUDENT IN AN ORGANIZED HEALTH CARE EDUCATION/TRAINING PROGRAM

## 2021-08-04 PROCEDURE — 99214 OFFICE O/P EST MOD 30 MIN: CPT | Performed by: STUDENT IN AN ORGANIZED HEALTH CARE EDUCATION/TRAINING PROGRAM

## 2021-08-04 PROCEDURE — 3008F BODY MASS INDEX DOCD: CPT | Performed by: STUDENT IN AN ORGANIZED HEALTH CARE EDUCATION/TRAINING PROGRAM

## 2021-08-04 RX ORDER — TOPIRAMATE 25 MG/1
50 TABLET ORAL NIGHTLY
Qty: 180 TABLET | Refills: 0
Start: 2021-08-04 | End: 2021-09-08 | Stop reason: SDUPTHER

## 2021-08-04 RX ORDER — LISINOPRIL 5 MG/1
5 TABLET ORAL DAILY
Qty: 90 TABLET | Refills: 0 | Status: SHIPPED | OUTPATIENT
Start: 2021-08-04 | End: 2021-09-15 | Stop reason: SDUPTHER

## 2021-08-04 ASSESSMENT — ENCOUNTER SYMPTOMS
EYES NEGATIVE: 1
FATIGUE: 1
CARDIOVASCULAR NEGATIVE: 1
ALLERGIC/IMMUNOLOGIC NEGATIVE: 1
PSYCHIATRIC NEGATIVE: 1
HEMATOLOGIC/LYMPHATIC NEGATIVE: 1
NEUROLOGICAL NEGATIVE: 1
GASTROINTESTINAL NEGATIVE: 1
MUSCULOSKELETAL NEGATIVE: 1
RESPIRATORY NEGATIVE: 1
ENDOCRINE NEGATIVE: 1

## 2021-08-04 NOTE — ASSESSMENT & PLAN NOTE
Chronic, improved on Topamax 25mg QHS, given lack of side effects but yet no effect on sugar cravings or appetite, dose increase to 50mg.

## 2021-08-04 NOTE — PATIENT INSTRUCTIONS
WHAT YOU SHOULD KNOW ABOUT SAXENDA    DOSING:  Saxenda® is a once-daily, self-injectable medicine that comes in a prefilled pen. Inject your dose under the skin (this is called a subcutaneous injection) in your stomach area (abdomen), upper leg (thigh), or upper arm. Do not inject into a vein or muscle. Saxenda® can be taken at any time, independent of meals. Once on Saxenda®, if you find a time that is convenient for you, try to take Saxenda® at that time every day.     Week 1: 0.6 mg per day  Week 2: 1.2 mg per day  Week 3: 1.8 mg per day  Week 4: 2.4 mg per day  Week 5: 3.0 mg per day  Once you are taking the 3-mg dose, each pen will last 6 days.    Please do not increase your daily dose any faster than 1 time per week. If you have more than mild nausea, you can continue on the same does for more than 1 week if needed.   We will recheck a lab test for kidney function after you are on the medication for 6 weeks.     SIDE EFFECTS: Nausea, diarrhea, constipation, headache, vomiting, low blood sugar (hypoglycemia), decreased appetite, upset stomach, tiredness, dizziness, stomach pain, and changes in enzyme (lipase) levels in your blood. Nausea is most common when first starting Saxenda®, but decreases over time in most people as their body gets used to the medicine. Serious but rare side effects include worsening of kidney function or pancreatitis.     COVERAGE FYI: If you have private or commercial insurance you may pay as little as $25 per 30-day supply (1 box) of Saxenda® subject to a maximum savings of $200 per 30-day supply. If you pay cash for your prescriptions, you can save up to $200 per 30-day supply (1 box) of Saxenda® with a prescription savings card. You can learn more at https://www.[email or web address]LLUSTRE/      -Counseled patient as below regarding initiation of Wegovy  Dosin.25 mg once weekly for 4 weeks, then increase to 0.5 mg once weekly for 4 weeks, then increase to 1 mg once weekly for 4 weeks, then  increase to 1.7 mg once weekly for 4 weeks, then increase to 2.4 mg once weekly for 4 weeks and remain on this dose.   You can take Wegovy with or without food. You may change the day of the week you use Wegovy as long as your last dose was taken 2 or more days before.  If you miss a dose of Wegovy , take the missed dose as soon as possible within 5 days after the missed dose. If more than 5 days have passed, skip the missed dose and take your next dose on the regularly scheduled day. Using the pen, Wegovy is injected under the skin of your abdomen, thigh, or upper arm. Do not inject into a muscle or vein. Change (rotate) your injection site with each injection. Do not use the same site for each injection. If you choose to inject in the same area, always use a different spot in that area.  SIDE EFFECTS: Nausea and fullness. This gets better with time. You could get a small lump at the injection site.

## 2021-08-04 NOTE — ASSESSMENT & PLAN NOTE
Chronic, slightly above goal /80 today on Lisinopril 2.5mg  Increase to Lisinopril 5mg daily, new Rx sent into pharmacy today.   Up to date on annual labwork with fasting CBC, CMP, TSH and Lipids.   Encouraged patient that I believe we will be able to get her off of blood pressure medication with weight loss.  We will continue to monitor blood pressure at all follow-up visits.

## 2021-08-04 NOTE — PROGRESS NOTES
ESTABLISHED PATIENT OFFICE VISIT    [facility]OM[facility]   [facility] [place] GUTIERREZ HERNANDEZ M.D.  [phone]      HPI - ASSESSMENT AND PLAN      CC:   Chief Complaint   Patient presents with   • Follow-up     Katy Escudero is a 20 y.o. female with a history of depression, anxiety with panic attacks, PTSD, borderline personality disorder, endometriosis, ADHD, migraines, vitamin D deficiency, and hypertension who presents for follow up.     At her last visit she was started on Topamax 25mg QHS to address migraine headaches, transitioned from Atenolol to Lisinopril to ensure she was on a weight neutral blood pressure agent.    Today she reports she has not had any migraines since starting Topamax and transitioning off of Atenolol.  She is kept a log of her blood pressures and note that her blood pressures have been higher at work on lisinopril 2.5 mg daily- average blood pressure 146/77.     Today she presents to review results of recent lab work, discuss pharmacotherapy to address obesity and for nutrition review.    She has found that her insurance will cover GLP-1 agonist therapy for weight loss with a prior authorization.    We reviewed today that she had normal lab work except for a low HDL and fasting blood sugar 94 on CMP, consistent with insulin resistance that would benefit from GLP-1 agonist therapy.    Assessment   Problem List Items Addressed This Visit        Nervous    Migraine with aura and without status migrainosus, not intractable     Chronic, improved on Topamax 25mg QHS, given lack of side effects but yet no effect on sugar cravings or appetite, dose increase to 50mg.            Relevant Medications    topiramate (TOPAMAX) 25 mg tablet       Circulatory    Essential hypertension     Chronic, slightly above goal /80 today on Lisinopril 2.5mg  Increase to Lisinopril 5mg daily, new Rx sent into pharmacy today.   Up to date on annual labwork with fasting CBC, CMP, TSH and Lipids.    Encouraged patient that I believe we will be able to get her off of blood pressure medication with weight loss.  We will continue to monitor blood pressure at all follow-up visits.           Relevant Medications    lisinopriL (PRINIVIL) 5 mg tablet       Endocrine/Metabolic    Class 2 severe obesity with serious comorbidity and body mass index (BMI) of 37.0 to 37.9 in adult (CMS/[facility]) - Primary     Today we discussed starting Saxenda to help with weight loss until Wegovy is available for the pharmacies.  Today I E prescribed Saxenda to her pharmacy as well as gave her a paper prescription for Wegovy 0.25mg weekly as well as a savings coupon.  We also discussed that if her prior authorization is not approved we will begin therapy with Metformin.  Counseled patient as below regarding Saxenda  Saxenda is a once-daily, self-injectable medicine that comes in a prefilled pen. Inject your dose under the skin (this is called a subcutaneous injection) in your stomach area (abdomen), upper leg (thigh), or upper arm. Do not inject into a vein or muscle. Saxenda® can be taken at any time, independent of meals. Once on Saxenda, if you find a time that is convenient for you, try to take Saxenda at that time every day.   Dosing: Week 1: 0.6 mg per day, Week 2: 1.2 mg per day, Week 3: 1.8 mg per day, Week 4: 2.4 mg per day, Week 5: 3.0 mg per day. Once you are taking the 3-mg dose, each pen will last 6 days. Please do not increase your daily dose any faster than 1 time per week. If you have more than mild nausea, you can continue on the same does for more than 1 week if needed.   We will recheck a lab test for kidney function after you are on the medication for 6 weeks.   SIDE EFFECTS: Nausea, diarrhea, constipation, headache, vomiting, low blood sugar (hypoglycemia), decreased appetite, upset stomach, tiredness, dizziness, stomach pain, and changes in enzyme (lipase) levels in your blood. Nausea is most common when first starting  Saxenda, but decreases over time in most people as their body gets used to the medicine. Serious but rare side effects include worsening of kidney function or pancreatitis.   WHO SHOULD NOT TAKE SAXENDA: Patients who are pregnant, have a history of pancreatitis kidney disease, a personal or family history of medullary thyroid cancer or multiple endocrine neoplasia 2A or 2B. In rats, Saxenda (and other medications like it) has caused an uncommon thyroid cancer, called medullary thyroid cancer, usually associated with a genetic cancer syndrome called MEN2.  -Counseled patient as below regarding initiation of Wegovy  Dosin.25 mg once weekly for 4 weeks, then increase to 0.5 mg once weekly for 4 weeks, then increase to 1 mg once weekly for 4 weeks, then increase to 1.7 mg once weekly for 4 weeks, then increase to 2.4 mg once weekly for 4 weeks and remain on this dose.   You can take Wegovy with or without food. You may change the day of the week you use Wegovy as long as your last dose was taken 2 or more days before.  If you miss a dose of Wegovy , take the missed dose as soon as possible within 5 days after the missed dose. If more than 5 days have passed, skip the missed dose and take your next dose on the regularly scheduled day. Using the pen, Wegovy is injected under the skin of your abdomen, thigh, or upper arm. Do not inject into a muscle or vein. Change (rotate) your injection site with each injection. Do not use the same site for each injection. If you choose to inject in the same area, always use a different spot in that area.  SIDE EFFECTS: Nausea and fullness. This gets better with time. You could get a small lump at the injection site.   WHO SHOULD NOT TAKE WEGOVY: Anyone with a history of pancreatitis, MEN2 syndrome, or kidney problems.  A woman of childbearing age who is planning on becoming pregnant or is nursing.     Provided extensive education to patient today on low glycemic index diet nutrition  plan with a goal of 1500 kcal per day, calculated to meet her basal metabolic rate, with 3 meals and 0-1 snack per day with intermittent fasting technique eating for less than 12 hours a day.  Goal servings as follows: 6 protein servings, 5 fat servings, 4 low fiber carbs, and >4 or more high fiber carbs per day.   Pair carbs with a fat to balance insulin levels and increase satiety.   Goal water intake of 64 ounces a day.   Eliminate all artificial sweeteners and limit processed foods.     Other Healthy Habits:   Cardiovascular activity with at least 150 minutes each week - walking, elliptical, biking etc for heart health.   Muscle building strength training activity for 3 sessions of at least 30 minutes each week - resistance bands, body weight or dumbbells - for metabolism maintenance.   8 hours of uninterrupted sleep a night - consider a 1 hour no screen time, wind down routine at night before bed to let your nervous system calm down and prepare for sleep.    Routine stress reduction or mindfulness practice to decrease cortisol (stress hormone) release in the body.            Relevant Medications    liraglutide, weight loss, 3 mg/0.5 mL (18 mg/3 mL) pen injector             PAST MEDICAL AND SURGICAL HISTORY        Past Medical History:   Diagnosis Date   • ADHD 7/20/2021   • Borderline personality disorder (CMS/HCC) 3/25/2021   • Depression 5/16/2019   • Endometriosis 3/25/2021   • Essential hypertension 7/20/2021   • Generalized anxiety disorder with panic attacks 7/20/2021   • Migraine with aura and without status migrainosus, not intractable 7/20/2021   • PTSD (post-traumatic stress disorder) 5/16/2019   • Vitamin D deficiency 7/20/2021       No past surgical history on file.  MEDICATIONS          Current Outpatient Medications:   •  clonazePAM (klonoPIN) 1 mg tablet, Take 1 mg by mouth 2 (two) times a day as needed., Disp: , Rfl:   •  dextroamphetamine-amphetamine (ADDERALL) 30 mg tablet, Take 30 mg by mouth  daily.  , Disp: , Rfl:   •  doxepin (SINEquan) 25 mg capsule, TAKE 1 TO 2 CAPSULES BY MOUTH EVERY DAY AT BEDTIME, Disp: , Rfl:   •  L norgest/e.estradioL-e.estrad (SEASONIQUE) 0.15 mg-30 mcg (84)/10 mcg (7) tablet, Take 1 tablet by mouth once daily., Disp: , Rfl:   •  lamoTRIgine (LaMICtal) 150 mg tablet, , Disp: , Rfl:   •  sertraline (ZOLOFT) 100 mg tablet, Take 200 mg by mouth., Disp: , Rfl:   •  topiramate (TOPAMAX) 25 mg tablet, Take 2 tablets (50 mg total) by mouth nightly., Disp: 180 tablet, Rfl: 0  •  liraglutide, weight loss, 3 mg/0.5 mL (18 mg/3 mL) pen injector, Inject into the skin over your abdomen as below: Week 1: 0.6 mg per day Week 2: 1.2 mg per day Week 3: 1.8 mg per day Week 4: 2.4 mg per day Week 5: 3.0 mg per day, Disp: 1 pen, Rfl: 1  •  lisinopriL (PRINIVIL) 5 mg tablet, Take 1 tablet (5 mg total) by mouth daily., Disp: 90 tablet, Rfl: 0  ALLERGIES        Patient has no known allergies.  FAMILY HISTORY        Family History   Problem Relation Age of Onset   • Diabetes Biological Mother    • Diabetes Biological Father      SOCIAL/ TOBACCO HISTORY        Social History     Tobacco Use   • Smoking status: Current Every Day Smoker     Types: Electronic Cigarette   • Smokeless tobacco: Never Used   Vaping Use   • Vaping Use: Some days   Substance Use Topics   • Alcohol use: Yes     Comment: socially    • Drug use: Not Currently     Types: Cocaine, Marijuana     REVIEW OF SYSTEMS        Review of Systems   Constitutional: Positive for fatigue.   HENT: Negative.    Eyes: Negative.    Respiratory: Negative.    Cardiovascular: Negative.    Gastrointestinal: Negative.    Endocrine: Negative.    Genitourinary: Negative.    Musculoskeletal: Negative.    Skin: Negative.    Allergic/Immunologic: Negative.    Neurological: Negative.    Hematological: Negative.    Psychiatric/Behavioral: Negative.       PHYSICAL EXAMINATION     Visit Vitals  /90   Pulse (!) 103   Temp 36.6 °C (97.8 °F)   Resp 16   Ht  "1.626 m (5' 4\")   Wt 99.8 kg (220 lb)   SpO2 98%   BMI 37.76 kg/m²        Physical Exam  Vitals reviewed.   Constitutional:       Appearance: Normal appearance. She is well-developed. She is obese.   HENT:      Head: Normocephalic and atraumatic.      Right Ear: External ear normal.      Left Ear: External ear normal.   Eyes:      General: Lids are normal.      Extraocular Movements: Extraocular movements intact.      Conjunctiva/sclera: Conjunctivae normal.      Pupils: Pupils are equal, round, and reactive to light.   Pulmonary:      Effort: Pulmonary effort is normal.      Breath sounds: Normal breath sounds. No decreased breath sounds.   Abdominal:      General: Abdomen is protuberant.   Musculoskeletal:      Cervical back: Normal range of motion.   Skin:     General: Skin is warm and dry.   Neurological:      General: No focal deficit present.      Mental Status: She is alert and oriented to person, place, and time. Mental status is at baseline.   Psychiatric:         Attention and Perception: Attention and perception normal.         Mood and Affect: Mood and affect normal.         Speech: Speech normal.         Behavior: Behavior normal. Behavior is cooperative.         Thought Content: Thought content normal.         Cognition and Memory: Cognition and memory normal.         Judgment: Judgment normal.        TIME   I spent 35 minutes on this date of service performing the following activities: obtaining history, performing examination, entering orders, documenting, preparing for visit, providing counseling and education and communicating results.    Tiffany Mauricio MD  8/4/2021     "

## 2021-08-04 NOTE — ASSESSMENT & PLAN NOTE
Today we discussed starting Saxenda to help with weight loss until Wegovy is available for the pharmacies.  Today I E prescribed Saxenda to her pharmacy as well as gave her a paper prescription for Wegovy 0.25mg weekly as well as a savings coupon.  We also discussed that if her prior authorization is not approved we will begin therapy with Metformin.  Counseled patient as below regarding Saxenda  Saxenda is a once-daily, self-injectable medicine that comes in a prefilled pen. Inject your dose under the skin (this is called a subcutaneous injection) in your stomach area (abdomen), upper leg (thigh), or upper arm. Do not inject into a vein or muscle. Saxenda® can be taken at any time, independent of meals. Once on Saxenda, if you find a time that is convenient for you, try to take Saxenda at that time every day.   Dosing: Week 1: 0.6 mg per day, Week 2: 1.2 mg per day, Week 3: 1.8 mg per day, Week 4: 2.4 mg per day, Week 5: 3.0 mg per day. Once you are taking the 3-mg dose, each pen will last 6 days. Please do not increase your daily dose any faster than 1 time per week. If you have more than mild nausea, you can continue on the same does for more than 1 week if needed.   We will recheck a lab test for kidney function after you are on the medication for 6 weeks.   SIDE EFFECTS: Nausea, diarrhea, constipation, headache, vomiting, low blood sugar (hypoglycemia), decreased appetite, upset stomach, tiredness, dizziness, stomach pain, and changes in enzyme (lipase) levels in your blood. Nausea is most common when first starting Saxenda, but decreases over time in most people as their body gets used to the medicine. Serious but rare side effects include worsening of kidney function or pancreatitis.   WHO SHOULD NOT TAKE SAXENDA: Patients who are pregnant, have a history of pancreatitis kidney disease, a personal or family history of medullary thyroid cancer or multiple endocrine neoplasia 2A or 2B. In rats, Saxenda (and  other medications like it) has caused an uncommon thyroid cancer, called medullary thyroid cancer, usually associated with a genetic cancer syndrome called MEN2.  -Counseled patient as below regarding initiation of Wegovy  Dosin.25 mg once weekly for 4 weeks, then increase to 0.5 mg once weekly for 4 weeks, then increase to 1 mg once weekly for 4 weeks, then increase to 1.7 mg once weekly for 4 weeks, then increase to 2.4 mg once weekly for 4 weeks and remain on this dose.   You can take Wegovy with or without food. You may change the day of the week you use Wegovy as long as your last dose was taken 2 or more days before.  If you miss a dose of Wegovy , take the missed dose as soon as possible within 5 days after the missed dose. If more than 5 days have passed, skip the missed dose and take your next dose on the regularly scheduled day. Using the pen, Wegovy is injected under the skin of your abdomen, thigh, or upper arm. Do not inject into a muscle or vein. Change (rotate) your injection site with each injection. Do not use the same site for each injection. If you choose to inject in the same area, always use a different spot in that area.  SIDE EFFECTS: Nausea and fullness. This gets better with time. You could get a small lump at the injection site.   WHO SHOULD NOT TAKE WEGOVY: Anyone with a history of pancreatitis, MEN2 syndrome, or kidney problems.  A woman of childbearing age who is planning on becoming pregnant or is nursing.     Provided extensive education to patient today on low glycemic index diet nutrition plan with a goal of 1500 kcal per day, calculated to meet her basal metabolic rate, with 3 meals and 0-1 snack per day with intermittent fasting technique eating for less than 12 hours a day.  Goal servings as follows: 6 protein servings, 5 fat servings, 4 low fiber carbs, and >4 or more high fiber carbs per day.   Pair carbs with a fat to balance insulin levels and increase satiety.   Goal  water intake of 64 ounces a day.   Eliminate all artificial sweeteners and limit processed foods.     Other Healthy Habits:   Cardiovascular activity with at least 150 minutes each week - walking, elliptical, biking etc for heart health.   Muscle building strength training activity for 3 sessions of at least 30 minutes each week - resistance bands, body weight or dumbbells - for metabolism maintenance.   8 hours of uninterrupted sleep a night - consider a 1 hour no screen time, wind down routine at night before bed to let your nervous system calm down and prepare for sleep.    Routine stress reduction or mindfulness practice to decrease cortisol (stress hormone) release in the body.

## 2021-08-05 ENCOUNTER — TELEPHONE (OUTPATIENT)
Dept: PRIMARY CARE | Facility: CLINIC | Age: 21
End: 2021-08-05

## 2021-08-09 ENCOUNTER — TELEPHONE (OUTPATIENT)
Dept: PRIMARY CARE | Facility: CLINIC | Age: 21
End: 2021-08-09

## 2021-08-09 NOTE — TELEPHONE ENCOUNTER
Alyson: Can you call patient and ask her if she has any questions about Saxenda?  I received a copy of her approval from [facility] and want to know if she has started medication. Thanks!

## 2021-09-08 DIAGNOSIS — G43.109 MIGRAINE WITH AURA AND WITHOUT STATUS MIGRAINOSUS, NOT INTRACTABLE: ICD-10-CM

## 2021-09-09 DIAGNOSIS — G43.109 MIGRAINE WITH AURA AND WITHOUT STATUS MIGRAINOSUS, NOT INTRACTABLE: ICD-10-CM

## 2021-09-09 RX ORDER — TOPIRAMATE 50 MG/1
50 TABLET, FILM COATED ORAL NIGHTLY
Qty: 90 TABLET | Refills: 0 | Status: SHIPPED | OUTPATIENT
Start: 2021-09-09 | End: 2021-09-15 | Stop reason: SDUPTHER

## 2021-09-09 RX ORDER — TOPIRAMATE 25 MG/1
50 TABLET ORAL NIGHTLY
Qty: 180 TABLET | Refills: 0 | Status: SHIPPED | OUTPATIENT
Start: 2021-09-09 | End: 2021-09-09

## 2021-09-09 RX ORDER — TOPIRAMATE 25 MG/1
50 TABLET ORAL NIGHTLY
Qty: 180 TABLET | Refills: 0 | Status: CANCELLED | OUTPATIENT
Start: 2021-09-09 | End: 2021-12-08

## 2021-09-15 ENCOUNTER — OFFICE VISIT (OUTPATIENT)
Dept: PRIMARY CARE | Facility: CLINIC | Age: 21
End: 2021-09-15
Payer: COMMERCIAL

## 2021-09-15 VITALS
RESPIRATION RATE: 18 BRPM | BODY MASS INDEX: 35.68 KG/M2 | HEIGHT: 64 IN | TEMPERATURE: 97.8 F | OXYGEN SATURATION: 96 % | WEIGHT: 209 LBS | HEART RATE: 125 BPM | SYSTOLIC BLOOD PRESSURE: 118 MMHG | DIASTOLIC BLOOD PRESSURE: 70 MMHG

## 2021-09-15 DIAGNOSIS — E66.01 CLASS 2 SEVERE OBESITY WITH SERIOUS COMORBIDITY AND BODY MASS INDEX (BMI) OF 37.0 TO 37.9 IN ADULT, UNSPECIFIED OBESITY TYPE (CMS/HCC): ICD-10-CM

## 2021-09-15 DIAGNOSIS — E66.812 CLASS 2 SEVERE OBESITY WITH SERIOUS COMORBIDITY AND BODY MASS INDEX (BMI) OF 35.0 TO 35.9 IN ADULT, UNSPECIFIED OBESITY TYPE (CMS/HCC): Primary | ICD-10-CM

## 2021-09-15 DIAGNOSIS — E66.812 CLASS 2 SEVERE OBESITY WITH SERIOUS COMORBIDITY AND BODY MASS INDEX (BMI) OF 37.0 TO 37.9 IN ADULT, UNSPECIFIED OBESITY TYPE (CMS/HCC): ICD-10-CM

## 2021-09-15 DIAGNOSIS — F60.3 BORDERLINE PERSONALITY DISORDER (CMS/HCC): ICD-10-CM

## 2021-09-15 DIAGNOSIS — Z23 NEED FOR INFLUENZA VACCINATION: ICD-10-CM

## 2021-09-15 DIAGNOSIS — I10 ESSENTIAL HYPERTENSION: ICD-10-CM

## 2021-09-15 DIAGNOSIS — G43.109 MIGRAINE WITH AURA AND WITHOUT STATUS MIGRAINOSUS, NOT INTRACTABLE: ICD-10-CM

## 2021-09-15 DIAGNOSIS — E66.01 CLASS 2 SEVERE OBESITY WITH SERIOUS COMORBIDITY AND BODY MASS INDEX (BMI) OF 35.0 TO 35.9 IN ADULT, UNSPECIFIED OBESITY TYPE (CMS/HCC): Primary | ICD-10-CM

## 2021-09-15 PROCEDURE — 3078F DIAST BP <80 MM HG: CPT | Performed by: STUDENT IN AN ORGANIZED HEALTH CARE EDUCATION/TRAINING PROGRAM

## 2021-09-15 PROCEDURE — 90686 IIV4 VACC NO PRSV 0.5 ML IM: CPT | Performed by: STUDENT IN AN ORGANIZED HEALTH CARE EDUCATION/TRAINING PROGRAM

## 2021-09-15 PROCEDURE — 90471 IMMUNIZATION ADMIN: CPT | Performed by: STUDENT IN AN ORGANIZED HEALTH CARE EDUCATION/TRAINING PROGRAM

## 2021-09-15 PROCEDURE — 3008F BODY MASS INDEX DOCD: CPT | Performed by: STUDENT IN AN ORGANIZED HEALTH CARE EDUCATION/TRAINING PROGRAM

## 2021-09-15 PROCEDURE — 3074F SYST BP LT 130 MM HG: CPT | Performed by: STUDENT IN AN ORGANIZED HEALTH CARE EDUCATION/TRAINING PROGRAM

## 2021-09-15 PROCEDURE — 99214 OFFICE O/P EST MOD 30 MIN: CPT | Mod: 25 | Performed by: STUDENT IN AN ORGANIZED HEALTH CARE EDUCATION/TRAINING PROGRAM

## 2021-09-15 RX ORDER — LISINOPRIL 5 MG/1
5 TABLET ORAL DAILY
Qty: 90 TABLET | Refills: 0 | Status: SHIPPED | OUTPATIENT
Start: 2021-09-15 | End: 2022-01-09

## 2021-09-15 RX ORDER — TOPIRAMATE 50 MG/1
50 TABLET, FILM COATED ORAL NIGHTLY
Qty: 90 TABLET | Refills: 0 | Status: SHIPPED | OUTPATIENT
Start: 2021-09-15 | End: 2022-02-16 | Stop reason: SDUPTHER

## 2021-09-15 ASSESSMENT — ENCOUNTER SYMPTOMS
PSYCHIATRIC NEGATIVE: 1
NEUROLOGICAL NEGATIVE: 1
EYES NEGATIVE: 1
MUSCULOSKELETAL NEGATIVE: 1
UNEXPECTED WEIGHT CHANGE: 1
CARDIOVASCULAR NEGATIVE: 1
GASTROINTESTINAL NEGATIVE: 1
APPETITE CHANGE: 1
ENDOCRINE NEGATIVE: 1
HEMATOLOGIC/LYMPHATIC NEGATIVE: 1
ALLERGIC/IMMUNOLOGIC NEGATIVE: 1
RESPIRATORY NEGATIVE: 1
FATIGUE: 1

## 2021-09-15 NOTE — ASSESSMENT & PLAN NOTE
Chronic, well controlled today with goal /80  Continue current antihypertensive regimen with Lisinopril 5mg - Rx refilled today.     Up to date with annual labwork with fasting CBC, CMP, TSH and Lipids.

## 2021-09-15 NOTE — ASSESSMENT & PLAN NOTE
Chronic, ongoing.   Counseled patient to reduce Saxenda to 1.2mg dosing and continue on this dose daily until you find that your appetite is no longer as suppressed, then increase to 1.8mg daily. You do not need to increase sooner than when you feel you need to.   Continue food logging - your food quality is excellent, strategize a way to incorporate protein with lunch or as a snack - try Built Bars or Greek Yogurt with your lunch.   Follow up with [facility] for continued guidance.  Dairy and cheese are fine - try to find grass fed or organic when you can.   Choose beer over mixed drinks with sodas, continue to limit to 2-3 servings per week.   Follow up in 6 weeks for med check.

## 2021-09-15 NOTE — PROGRESS NOTES
ESTABLISHED PATIENT OFFICE VISIT    WOMEN'S PRIMARY CARE   [facility] [place] [facility] HERNANDE[facility] M.D.  [phone]      HPI - ASSESSMENT AND PLAN      CC:   Chief Complaint   Patient presents with   • Follow-up     Katy Escudero is a 21 y.o. female with a history of depression, anxiety with panic attacks, PTSD, borderline personality disorder, endometriosis, ADHD, migraines, vitamin D deficiency, and hypertension who presents for follow up.     She has lost 11# over the last month. She is using Saxenda 2-3x per week now because taking it daily caused her too much appetite suppression to the point she did not eat for 3 days. She completed 0.6, 1.2, 1.8, and 2.4mg dosing. 3-4 weeks in to starting the medication around the 1.8mg dose she had significant appetite suppression. Denies nausea, vomiting, constipation or diarrhea.     She has been keeping a food log - she has identified that dairy is a problem for her. She loves cheese. She also has found that alcohol is a problem for her, especially now that football season has restarted. She is drinking 3 drinks a week typically beer or coke and rum.   She does not snack ever. Does not eat red meat or pork.   When she is working from home she eats breakfast with oatmeal and nuts and dinner - typically fish and asparagus for dinner. At the office she has salad with cucumbers, tomatoes and feta and green lentils or another legume. Salad at lunch does not have additional protein, if she doesn't have dinner, she has struggled to get enough protein in. Fruits are satisfying sugar cravings.      She is following with a psychiatrist regularly for her mood disorder.     Topamax is a 10/10 - she has had no headaches on Topamax.   Assessment   Problem List Items Addressed This Visit        Nervous    Migraine with aura and without status migrainosus, not intractable     Chronic, significantly improved on Topamax 50mg QHS.   Rx refilled today.          Relevant Medications     topiramate (TOPAMAX) 50 mg tablet       Circulatory    Essential hypertension     Chronic, well controlled today with goal /80  Continue current antihypertensive regimen with Lisinopril 5mg - Rx refilled today.     Up to date with annual labwork with fasting CBC, CMP, TSH and Lipids.              Relevant Medications    lisinopriL (PRINIVIL) 5 mg tablet       Endocrine/Metabolic    Class 2 severe obesity with serious comorbidity and body mass index (BMI) of 35.0 to 35.9 in adult (CMS/HCC) - Primary     Chronic, ongoing.   Counseled patient to reduce Saxenda to 1.2mg dosing and continue on this dose daily until you find that your appetite is no longer as suppressed, then increase to 1.8mg daily. You do not need to increase sooner than when you feel you need to.   Continue food logging - your food quality is excellent, strategize a way to incorporate protein with lunch or as a snack - try Built Bars or Greek Yogurt with your lunch.   Follow up with [facility] for continued guidance.  Dairy and cheese are fine - try to find grass fed or organic when you can.   Choose beer over mixed drinks with sodas, continue to limit to 2-3 servings per week.   Follow up in 6 weeks for med check.          Relevant Orders    Ambulatory referral to [facility]       Other    Borderline personality disorder (CMS/HCC)     Chronic, stable.   Continue regular follow up with therapy and Psychiatry.            Other Visit Diagnoses     Class 2 severe obesity with serious comorbidity and body mass index (BMI) of 37.0 to 37.9 in adult, unspecified obesity type (CMS/HCC)        Need for influenza vaccination        Relevant Orders    Influenza vaccine quadrivalent preservative free 6 mon and older IM (FluLaval) (Completed)             PAST MEDICAL AND SURGICAL HISTORY        Past Medical History:   Diagnosis Date   • ADHD 7/20/2021   • Borderline personality disorder (CMS/HCC) 3/25/2021   • Depression 5/16/2019   •  "Endometriosis 3/25/2021   • Essential hypertension 7/20/2021   • Generalized anxiety disorder with panic attacks 7/20/2021   • Migraine with aura and without status migrainosus, not intractable 7/20/2021   • PTSD (post-traumatic stress disorder) 5/16/2019   • Vitamin D deficiency 7/20/2021       No past surgical history on file.  MEDICATIONS          Current Outpatient Medications:   •  clonazePAM (klonoPIN) 1 mg tablet, Take 1 mg by mouth 2 (two) times a day as needed., Disp: , Rfl:   •  dextroamphetamine-amphetamine (ADDERALL) 30 mg tablet, Take 30 mg by mouth daily.  , Disp: , Rfl:   •  doxepin (SINEquan) 25 mg capsule, TAKE 1 TO 2 CAPSULES BY MOUTH EVERY DAY AT BEDTIME, Disp: , Rfl:   •  L norgest/e.estradioL-e.estrad (SEASONIQUE) 0.15 mg-30 mcg (84)/10 mcg (7) tablet, Take 1 tablet by mouth once daily., Disp: , Rfl:   •  lamoTRIgine (LaMICtal) 150 mg tablet, , Disp: , Rfl:   •  liraglutide, weight loss, 3 mg/0.5 mL (18 mg/3 mL) pen injector, Inject into the skin over your abdomen as below: Week 1: 0.6 mg per day Week 2: 1.2 mg per day Week 3: 1.8 mg per day Week 4: 2.4 mg per day Week 5: 3.0 mg per day, Disp: 1 pen, Rfl: 1  •  lisinopriL (PRINIVIL) 5 mg tablet, Take 1 tablet (5 mg total) by mouth daily., Disp: 90 tablet, Rfl: 0  •  sertraline (ZOLOFT) 100 mg tablet, Take 200 mg by mouth., Disp: , Rfl:   •  topiramate (TOPAMAX) 50 mg tablet, Take 1 tablet (50 mg total) by mouth nightly., Disp: 90 tablet, Rfl: 0  •  pen needle, diabetic 32 gauge x 1/4\" needle, Needle to be used with Saxenda Pen for subcutaneous injection, Disp: 50 each, Rfl: 1  ALLERGIES        Patient has no known allergies.  FAMILY HISTORY        Family History   Problem Relation Age of Onset   • Diabetes Biological Mother    • Diabetes Biological Father      SOCIAL/ TOBACCO HISTORY        Social History     Tobacco Use   • Smoking status: Current Every Day Smoker     Types: Electronic Cigarette   • Smokeless tobacco: Never Used   Vaping Use " "  • Vaping Use: Some days   Substance Use Topics   • Alcohol use: Yes     Comment: socially    • Drug use: Not Currently     Types: Cocaine, Marijuana     REVIEW OF SYSTEMS        Review of Systems   Constitutional: Positive for appetite change, fatigue and unexpected weight change.   HENT: Negative.    Eyes: Negative.    Respiratory: Negative.    Cardiovascular: Negative.    Gastrointestinal: Negative.    Endocrine: Negative.    Genitourinary: Negative.    Musculoskeletal: Negative.    Skin: Negative.    Allergic/Immunologic: Negative.    Neurological: Negative.    Hematological: Negative.    Psychiatric/Behavioral: Negative.       PHYSICAL EXAMINATION     Visit Vitals  /70   Pulse (!) 125   Temp 36.6 °C (97.8 °F)   Resp 18   Ht 1.626 m (5' 4\")   Wt 94.8 kg (209 lb)   SpO2 96%   BMI 35.87 kg/m²        Physical Exam  Vitals reviewed.   Constitutional:       Appearance: Normal appearance. She is well-developed. She is obese.   HENT:      Head: Normocephalic and atraumatic.      Right Ear: External ear normal.      Left Ear: External ear normal.   Eyes:      General: Lids are normal.      Extraocular Movements: Extraocular movements intact.      Conjunctiva/sclera: Conjunctivae normal.      Pupils: Pupils are equal, round, and reactive to light.   Pulmonary:      Effort: Pulmonary effort is normal.      Breath sounds: Normal breath sounds. No decreased breath sounds.   Abdominal:      General: Abdomen is protuberant.   Musculoskeletal:      Cervical back: Normal range of motion.   Skin:     General: Skin is warm and dry.   Neurological:      General: No focal deficit present.      Mental Status: She is alert and oriented to person, place, and time. Mental status is at baseline.   Psychiatric:         Attention and Perception: Attention and perception normal.         Mood and Affect: Mood and affect normal.         Speech: Speech normal.         Behavior: Behavior normal. Behavior is cooperative.         Thought " Content: Thought content normal.         Cognition and Memory: Cognition and memory normal.         Judgment: Judgment normal.        TIME   I spent 30 minutes on this date of service performing the following activities: obtaining history, performing examination, entering orders, documenting and providing counseling and education.    Tiffany Mauricio MD  9/15/2021

## 2021-10-03 DIAGNOSIS — E66.01 CLASS 2 SEVERE OBESITY WITH SERIOUS COMORBIDITY AND BODY MASS INDEX (BMI) OF 37.0 TO 37.9 IN ADULT, UNSPECIFIED OBESITY TYPE (CMS/HCC): ICD-10-CM

## 2021-10-03 DIAGNOSIS — E66.812 CLASS 2 SEVERE OBESITY WITH SERIOUS COMORBIDITY AND BODY MASS INDEX (BMI) OF 37.0 TO 37.9 IN ADULT, UNSPECIFIED OBESITY TYPE (CMS/HCC): ICD-10-CM

## 2021-10-04 RX ORDER — LIRAGLUTIDE 6 MG/ML
INJECTION, SOLUTION SUBCUTANEOUS
Qty: 3 ML | Refills: 1 | Status: SHIPPED | OUTPATIENT
Start: 2021-10-04 | End: 2021-12-15

## 2021-11-22 RX ORDER — PEN NEEDLE, DIABETIC 32GX 5/32"
NEEDLE, DISPOSABLE MISCELLANEOUS
Qty: 100 EACH | Refills: 1 | Status: SHIPPED | OUTPATIENT
Start: 2021-11-22

## 2022-01-09 DIAGNOSIS — I10 ESSENTIAL HYPERTENSION: ICD-10-CM

## 2022-01-09 RX ORDER — LISINOPRIL 5 MG/1
TABLET ORAL
Qty: 90 TABLET | Refills: 0 | Status: SHIPPED | OUTPATIENT
Start: 2022-01-09 | End: 2022-04-11

## 2022-02-15 ASSESSMENT — ENCOUNTER SYMPTOMS
NEUROLOGICAL NEGATIVE: 1
ALLERGIC/IMMUNOLOGIC NEGATIVE: 1
HEMATOLOGIC/LYMPHATIC NEGATIVE: 1
ENDOCRINE NEGATIVE: 1
EYES NEGATIVE: 1
RESPIRATORY NEGATIVE: 1
PSYCHIATRIC NEGATIVE: 1
MUSCULOSKELETAL NEGATIVE: 1
CARDIOVASCULAR NEGATIVE: 1

## 2022-02-15 NOTE — PROGRESS NOTES
ESTABLISHED PATIENT OFFICE VISIT    WOMEN'S PRIMARY CARE   [facility] [place] [facility] HERNANDE[facility] M.D.  [phone]      HPI - ASSESSMENT AND PLAN      CC:   Chief Complaint   Patient presents with   • Med Management     Katy Escudero is a 21 y.o. female with a history of depression, anxiety with panic attacks, PTSD, borderline personality disorder, endometriosis, ADHD, migraines, vitamin D deficiency, and hypertension who presents for follow up.     She was last seen in the office 9/15/2021, at that visit:  Continued on Topamax 50mg, Lisinopril 5mg, reduced Saxenda to 1.2mg  Recommended to follow up in 6 weeks for med check.     Since that visit:  Currently taking Saxenda 3mg daily - tolerating this well now without nausea or vomiting, but having severe constipation, only having a bowel movement once a week and now has bad hemorrhoids. Using [facility] stool softener colace a few times a week. Drinking minimal water 16 ounces of water a day. Drinking 16 ounces of coffee.     Moving in 2 weeks to [place].     No side effects to Topamax.     BP well controlled at home on Lisinopril - typically 110's/70.     Assessment   Problem List Items Addressed This Visit        Nervous    Migraine with aura and without status migrainosus, not intractable     Chronic, well controlled.   Continue Topamax 50mg QHS.          Relevant Medications    topiramate (TOPAMAX) 50 mg tablet       Circulatory    Essential hypertension - Primary     BP well controlled today.   Stop Lisinopril tomorrow - check BP twice daily and send me a log over the week, if BP is > 130/80 on multiple measurements we will restart, if not, ok to remain off. As you continue to lose weight, you likely will not need this medication, but now may not be the perfect time.   Educated patient that high blood pressure can come from various reasons.    Excess weight can worsen and sometimes be the cause of high blood pressure through putting pressure on the kidneys through  "mechanical forces as well as increasing body inflammation and promoting water retention.  With change in nutrition and increased physical activity, blood pressure medications may need to be reduced or discontinued.               Digestive    Drug-induced constipation     For your constipation:  Tonight take a dose of Milk of Magnesia over the counter - this will stimulate a large bowel moevement, if you don't get a response from one dose, you can take a second dose.  You do not want to use this medication every day, its pretty strong.   To prevent further constipation  Start drinking as much water as you can up to 64 ounces a day - start slow - add in a cup with each meal and a cup when you first wake up.   Start a daily fiber supplement in the morning - you can try Metamucil or Fiber Gummy - take this with water and 1 capful of Miralax and this can hydrate your stool and promote a bowel movement.   Make and keep a \"poop date\" with yourself - same time, same place every morning after water, fiber and Miralax to \"train\" your bowels.             Endocrine/Metabolic    Class 1 obesity without serious comorbidity with body mass index (BMI) of 32.0 to 32.9 in adult     Continue Saxenda 3mg daily for now, call you insurance and find out if you are covered for Wegovy, this is a more powerful weekly version of Saxenda and this can help you push through plateau, let me know over My Chart if you are covered and we can make this transition.     Wegovy dosing and storage instructions: Wegovy must be stored in the refrigerator.  There is 1 dose per pen.  There are 4 pens per month, you will need a new prescription each month and need to send me a message when you are on your final dose each month telling me what dose you are currently taking so I know which next dosage to send to your pharmacy.  Injection sites include your lower abdomen or upper thighs.  Please rotate sites weekly.    Month 1: Inject 0.25 mg weekly.  Month 2: " Inject 0.5 mg weekly  Month 3: Inject 1 mg weekly  Month 4: Inject 1.7 mg weekly  Month 5: Inject 2.4 mg weekly.    Most common side effects for this medication include mild nausea and constipation.     If you develop nausea, you do not want to increase to the next higher dose until nausea has resolved.  Mild nausea is not a reason to stop the medication.  When you require a refill, please contact the office at least 5 days in advance and let us know how you are tolerating your current injection.  This will allow us to determine if we increase your prescription or repeat the same dose for another month.    If you develop slow bowels, the best defense is increasing your water intake and adding a fiber supplement like Metamucil or FiberCon to your daily regimen.  You could also use MiraLAX 1 capful daily or dulcosate (colace or dulcolax)  as a stool softener 1-2 tabs twice daily.  If you go 3 days without a bowel movement, please use milk of magnesium 30 ml every 12 hours for 3 doses or until BM.     Rare but serious side effects include:  You should not take this medication if you are pregnant, nursing, or plan to become pregnant in the next 3 months.  You should not take this medicine if you have history of unexplained pancreatitis.  If you develop severe abdominal pain while taking this medication, please discontinue the medication and contact our office.  In mouse models, this medication induced a rare form of thyroid tumor called a medullary thyroid tumor.  While we have not seen this happen in humans in the past 10 years of using this type of medication, anyone with a family history of this rare thyroid tumor, should not use this medication.  Please let us know if you develop any difficulty swallowing, or voice changes while on this medication as this could be a sign of a problem developing with your thyroid.           Relevant Medications    liraglutide, weight loss, (SAXENDA) 3 mg/0.5 mL (18 mg/3 mL) pen  "injector             PAST MEDICAL AND SURGICAL HISTORY        Past Medical History:   Diagnosis Date   • ADHD 7/20/2021   • Borderline personality disorder (CMS/HCC) 3/25/2021   • Depression 5/16/2019   • Endometriosis 3/25/2021   • Essential hypertension 7/20/2021   • Generalized anxiety disorder with panic attacks 7/20/2021   • Migraine with aura and without status migrainosus, not intractable 7/20/2021   • PTSD (post-traumatic stress disorder) 5/16/2019   • Vitamin D deficiency 7/20/2021       No past surgical history on file.  MEDICATIONS          Current Outpatient Medications:   •  BD JON 2ND GEN PEN NEEDLE 32 gauge x 5/32\" needle, NEEDLE TO BE USED WITH SAXENDA PEN FOR SUBCUTANEOUS INJECTION, Disp: 100 each, Rfl: 1  •  clonazePAM (klonoPIN) 1 mg tablet, Take 1 mg by mouth 2 (two) times a day as needed., Disp: , Rfl:   •  dextroamphetamine-amphetamine (ADDERALL) 30 mg tablet, Take 30 mg by mouth daily.  , Disp: , Rfl:   •  doxepin (SINEquan) 25 mg capsule, TAKE 1 TO 2 CAPSULES BY MOUTH EVERY DAY AT BEDTIME, Disp: , Rfl:   •  L norgest/e.estradioL-e.estrad (SEASONIQUE) 0.15 mg-30 mcg (84)/10 mcg (7) tablet, Take 1 tablet by mouth once daily., Disp: , Rfl:   •  lamoTRIgine (LaMICtal) 150 mg tablet, , Disp: , Rfl:   •  liraglutide, weight loss, (SAXENDA) 3 mg/0.5 mL (18 mg/3 mL) pen injector, 3 mg by abdominal subcutaneous route daily., Disp: 1 pen, Rfl: 3  •  lisinopriL 5 mg tablet, TAKE 1 TABLET BY MOUTH EVERY DAY, Disp: 90 tablet, Rfl: 0  •  sertraline (ZOLOFT) 100 mg tablet, Take 200 mg by mouth., Disp: , Rfl:   •  topiramate (TOPAMAX) 50 mg tablet, Take 1 tablet (50 mg total) by mouth nightly., Disp: 90 tablet, Rfl: 0  ALLERGIES        Patient has no known allergies.  FAMILY HISTORY        Family History   Problem Relation Age of Onset   • Diabetes Biological Mother    • Diabetes Biological Father      SOCIAL/ TOBACCO HISTORY        Social History     Tobacco Use   • Smoking status: Current Every Day Smoker " "    Types: Electronic Cigarette   • Smokeless tobacco: Never Used   Vaping Use   • Vaping Use: Some days   Substance Use Topics   • Alcohol use: Yes     Comment: socially    • Drug use: Not Currently     Types: Cocaine, Marijuana     REVIEW OF SYSTEMS        Review of Systems   Constitutional: Positive for activity change and appetite change.   HENT: Negative.    Eyes: Negative.    Respiratory: Negative.    Cardiovascular: Negative.    Gastrointestinal: Positive for constipation.   Endocrine: Negative.    Genitourinary: Negative.    Musculoskeletal: Negative.    Skin: Negative.    Allergic/Immunologic: Negative.    Neurological: Negative.    Hematological: Negative.    Psychiatric/Behavioral: Negative.       PHYSICAL EXAMINATION     Visit Vitals  /70   Pulse (!) 106   Temp 36.8 °C (98.3 °F)   Resp 16   Ht 1.626 m (5' 4\")   Wt 86.2 kg (190 lb)   SpO2 98%   BMI 32.61 kg/m²        Physical Exam  Constitutional:       General: She is awake.      Appearance: Normal appearance. She is well-groomed.   HENT:      Head: Normocephalic and atraumatic.   Eyes:      General: Lids are normal.      Extraocular Movements: Extraocular movements intact.      Conjunctiva/sclera: Conjunctivae normal.   Neck:      Trachea: Phonation normal.   Pulmonary:      Effort: Pulmonary effort is normal.      Breath sounds: Normal breath sounds.   Skin:     General: Skin is warm and dry.   Neurological:      General: No focal deficit present.      Mental Status: She is alert and oriented to person, place, and time. Mental status is at baseline.   Psychiatric:         Attention and Perception: Attention and perception normal.         Mood and Affect: Mood and affect normal.         Speech: Speech normal.         Behavior: Behavior normal. Behavior is cooperative.         Thought Content: Thought content normal.         Cognition and Memory: Cognition and memory normal.         Judgment: Judgment normal.        TIME   I spent 30 minutes on " this date of service performing the following activities: obtaining history, performing examination, entering orders, documenting, preparing for visit and providing counseling and education.    Tiffany Mauricio MD  2/16/2022

## 2022-02-16 ENCOUNTER — OFFICE VISIT (OUTPATIENT)
Dept: PRIMARY CARE | Facility: CLINIC | Age: 22
End: 2022-02-16
Payer: COMMERCIAL

## 2022-02-16 VITALS
WEIGHT: 190 LBS | OXYGEN SATURATION: 98 % | DIASTOLIC BLOOD PRESSURE: 70 MMHG | BODY MASS INDEX: 32.44 KG/M2 | SYSTOLIC BLOOD PRESSURE: 108 MMHG | TEMPERATURE: 98.3 F | HEIGHT: 64 IN | RESPIRATION RATE: 16 BRPM | HEART RATE: 106 BPM

## 2022-02-16 DIAGNOSIS — E66.811 CLASS 1 OBESITY WITHOUT SERIOUS COMORBIDITY WITH BODY MASS INDEX (BMI) OF 32.0 TO 32.9 IN ADULT, UNSPECIFIED OBESITY TYPE: ICD-10-CM

## 2022-02-16 DIAGNOSIS — G43.109 MIGRAINE WITH AURA AND WITHOUT STATUS MIGRAINOSUS, NOT INTRACTABLE: ICD-10-CM

## 2022-02-16 DIAGNOSIS — I10 ESSENTIAL HYPERTENSION: Primary | ICD-10-CM

## 2022-02-16 DIAGNOSIS — K59.03 DRUG-INDUCED CONSTIPATION: ICD-10-CM

## 2022-02-16 PROCEDURE — 3078F DIAST BP <80 MM HG: CPT | Performed by: STUDENT IN AN ORGANIZED HEALTH CARE EDUCATION/TRAINING PROGRAM

## 2022-02-16 PROCEDURE — 3008F BODY MASS INDEX DOCD: CPT | Performed by: STUDENT IN AN ORGANIZED HEALTH CARE EDUCATION/TRAINING PROGRAM

## 2022-02-16 PROCEDURE — 99214 OFFICE O/P EST MOD 30 MIN: CPT | Performed by: STUDENT IN AN ORGANIZED HEALTH CARE EDUCATION/TRAINING PROGRAM

## 2022-02-16 PROCEDURE — 3074F SYST BP LT 130 MM HG: CPT | Performed by: STUDENT IN AN ORGANIZED HEALTH CARE EDUCATION/TRAINING PROGRAM

## 2022-02-16 RX ORDER — TOPIRAMATE 50 MG/1
50 TABLET, FILM COATED ORAL NIGHTLY
Qty: 90 TABLET | Refills: 0 | Status: SHIPPED | OUTPATIENT
Start: 2022-02-16 | End: 2022-05-17

## 2022-02-16 RX ORDER — LIRAGLUTIDE 6 MG/ML
3 INJECTION, SOLUTION SUBCUTANEOUS DAILY
Qty: 1 PEN | Refills: 3 | Status: SHIPPED | OUTPATIENT
Start: 2022-02-16 | End: 2022-03-09 | Stop reason: SDUPTHER

## 2022-02-16 ASSESSMENT — ENCOUNTER SYMPTOMS
ACTIVITY CHANGE: 1
CONSTIPATION: 1
APPETITE CHANGE: 1

## 2022-02-16 NOTE — ASSESSMENT & PLAN NOTE
"For your constipation:  Tonight take a dose of Milk of Magnesia over the counter - this will stimulate a large bowel moevement, if you don't get a response from one dose, you can take a second dose.  You do not want to use this medication every day, its pretty strong.   To prevent further constipation  Start drinking as much water as you can up to 64 ounces a day - start slow - add in a cup with each meal and a cup when you first wake up.   Start a daily fiber supplement in the morning - you can try Metamucil or Fiber Gummy - take this with water and 1 capful of Miralax and this can hydrate your stool and promote a bowel movement.   Make and keep a \"poop date\" with yourself - same time, same place every morning after water, fiber and Miralax to \"train\" your bowels.   "

## 2022-02-16 NOTE — PATIENT INSTRUCTIONS
"Look up Danielle Marshall at [place]   Stop Lisinopril tomorrow - check BP twice daily and send me a log over the week, if BP is > 130/80 on multiple measurements we will restart, if not, ok to remain off. As you continue to lose weight, you likely will not need this medication, but now may not be the perfect time.   Continue Saxenda 3mg daily for now, call you insurance and find out if you are covered for Wegovy, this is a more powerful weekly version of Saxenda and this can help you push through plateau, let me know over My Chart if you are covered and we can make this transition.   For your constipation:  Tonight take a dose of Milk of Magnesia over the counter - this will stimulate a large bowel moevement, if you don't get a response from one dose, you can take a second dose.  You do not want to use this medication every day, its pretty strong.   To prevent further constipation  Start drinking as much water as you can up to 64 ounces a day - start slow - add in a cup with each meal and a cup when you first wake up.   Start a daily fiber supplement in the morning - you can try Metamucil or Fiber Gummy - take this with water and 1 capful of Miralax and this can hydrate your stool and promote a bowel movement.   Make and keep a \"poop date\" with yourself - same time, same place every morning after water, fiber and Miralax to \"train\" your bowels.     Wegovy dosing and storage instructions: Wegovy must be stored in the refrigerator.  There is 1 dose per pen.  There are 4 pens per month, you will need a new prescription each month and need to send me a message when you are on your final dose each month telling me what dose you are currently taking so I know which next dosage to send to your pharmacy.  Injection sites include your lower abdomen or upper thighs.  Please rotate sites weekly.    Month 1: Inject 0.25 mg weekly.  Month 2: Inject 0.5 mg weekly  Month 3: Inject 1 mg weekly  Month 4: Inject 1.7 mg weekly  Month " 5: Inject 2.4 mg weekly.    Most common side effects for this medication include mild nausea and constipation.     If you develop nausea, you do not want to increase to the next higher dose until nausea has resolved.  Mild nausea is not a reason to stop the medication.  When you require a refill, please contact the office at least 5 days in advance and let us know how you are tolerating your current injection.  This will allow us to determine if we increase your prescription or repeat the same dose for another month.    If you develop slow bowels, the best defense is increasing your water intake and adding a fiber supplement like Metamucil or FiberCon to your daily regimen.  You could also use MiraLAX 1 capful daily or dulcosate (colace or dulcolax)  as a stool softener 1-2 tabs twice daily.  If you go 3 days without a bowel movement, please use milk of magnesium 30 ml every 12 hours for 3 doses or until BM.     Rare but serious side effects include:  You should not take this medication if you are pregnant, nursing, or plan to become pregnant in the next 3 months.  You should not take this medicine if you have history of unexplained pancreatitis.  If you develop severe abdominal pain while taking this medication, please discontinue the medication and contact our office.  In mouse models, this medication induced a rare form of thyroid tumor called a medullary thyroid tumor.  While we have not seen this happen in humans in the past 10 years of using this type of medication, anyone with a family history of this rare thyroid tumor, should not use this medication.  Please let us know if you develop any difficulty swallowing, or voice changes while on this medication as this could be a sign of a problem developing with your thyroid.

## 2022-02-16 NOTE — ASSESSMENT & PLAN NOTE
Continue Saxenda 3mg daily for now, call you insurance and find out if you are covered for Wegovy, this is a more powerful weekly version of Saxenda and this can help you push through plateau, let me know over My Chart if you are covered and we can make this transition.     Wegovy dosing and storage instructions: Wegovy must be stored in the refrigerator.  There is 1 dose per pen.  There are 4 pens per month, you will need a new prescription each month and need to send me a message when you are on your final dose each month telling me what dose you are currently taking so I know which next dosage to send to your pharmacy.  Injection sites include your lower abdomen or upper thighs.  Please rotate sites weekly.    Month 1: Inject 0.25 mg weekly.  Month 2: Inject 0.5 mg weekly  Month 3: Inject 1 mg weekly  Month 4: Inject 1.7 mg weekly  Month 5: Inject 2.4 mg weekly.    Most common side effects for this medication include mild nausea and constipation.     If you develop nausea, you do not want to increase to the next higher dose until nausea has resolved.  Mild nausea is not a reason to stop the medication.  When you require a refill, please contact the office at least 5 days in advance and let us know how you are tolerating your current injection.  This will allow us to determine if we increase your prescription or repeat the same dose for another month.    If you develop slow bowels, the best defense is increasing your water intake and adding a fiber supplement like Metamucil or FiberCon to your daily regimen.  You could also use MiraLAX 1 capful daily or dulcosate (colace or dulcolax)  as a stool softener 1-2 tabs twice daily.  If you go 3 days without a bowel movement, please use milk of magnesium 30 ml every 12 hours for 3 doses or until BM.     Rare but serious side effects include:  You should not take this medication if you are pregnant, nursing, or plan to become pregnant in the next 3 months.  You should not  take this medicine if you have history of unexplained pancreatitis.  If you develop severe abdominal pain while taking this medication, please discontinue the medication and contact our office.  In mouse models, this medication induced a rare form of thyroid tumor called a medullary thyroid tumor.  While we have not seen this happen in humans in the past 10 years of using this type of medication, anyone with a family history of this rare thyroid tumor, should not use this medication.  Please let us know if you develop any difficulty swallowing, or voice changes while on this medication as this could be a sign of a problem developing with your thyroid.

## 2022-02-16 NOTE — ASSESSMENT & PLAN NOTE
BP well controlled today.   Stop Lisinopril tomorrow - check BP twice daily and send me a log over the week, if BP is > 130/80 on multiple measurements we will restart, if not, ok to remain off. As you continue to lose weight, you likely will not need this medication, but now may not be the perfect time.   Educated patient that high blood pressure can come from various reasons.    Excess weight can worsen and sometimes be the cause of high blood pressure through putting pressure on the kidneys through mechanical forces as well as increasing body inflammation and promoting water retention.  With change in nutrition and increased physical activity, blood pressure medications may need to be reduced or discontinued.

## 2022-03-09 DIAGNOSIS — E66.811 CLASS 1 OBESITY WITHOUT SERIOUS COMORBIDITY WITH BODY MASS INDEX (BMI) OF 32.0 TO 32.9 IN ADULT, UNSPECIFIED OBESITY TYPE: ICD-10-CM

## 2022-03-09 RX ORDER — LIRAGLUTIDE 6 MG/ML
3 INJECTION, SOLUTION SUBCUTANEOUS DAILY
Qty: 5 PEN | Refills: 1 | OUTPATIENT
Start: 2022-03-09

## 2022-03-09 RX ORDER — LIRAGLUTIDE 6 MG/ML
3 INJECTION, SOLUTION SUBCUTANEOUS DAILY
Qty: 1 PEN | Refills: 5 | Status: SHIPPED | OUTPATIENT
Start: 2022-03-09

## 2022-03-11 ENCOUNTER — TELEPHONE (OUTPATIENT)
Dept: PRIMARY CARE | Facility: CLINIC | Age: 22
End: 2022-03-11
Payer: COMMERCIAL

## 2022-04-11 DIAGNOSIS — I10 ESSENTIAL HYPERTENSION: ICD-10-CM

## 2022-04-11 RX ORDER — LISINOPRIL 5 MG/1
TABLET ORAL
Qty: 90 TABLET | Refills: 0 | Status: SHIPPED | OUTPATIENT
Start: 2022-04-11